# Patient Record
Sex: FEMALE | Race: WHITE | NOT HISPANIC OR LATINO | URBAN - METROPOLITAN AREA
[De-identification: names, ages, dates, MRNs, and addresses within clinical notes are randomized per-mention and may not be internally consistent; named-entity substitution may affect disease eponyms.]

---

## 2017-01-17 ENCOUNTER — OFFICE VISIT (OUTPATIENT)
Dept: INTERNAL MEDICINE | Facility: CLINIC | Age: 52
End: 2017-01-17

## 2017-01-17 VITALS
HEIGHT: 64 IN | WEIGHT: 142.4 LBS | BODY MASS INDEX: 24.31 KG/M2 | DIASTOLIC BLOOD PRESSURE: 70 MMHG | SYSTOLIC BLOOD PRESSURE: 102 MMHG

## 2017-01-17 DIAGNOSIS — R10.2 VAGINAL PAIN: Primary | ICD-10-CM

## 2017-01-17 DIAGNOSIS — Z12.11 SCREENING FOR COLON CANCER: ICD-10-CM

## 2017-01-17 DIAGNOSIS — R53.82 CHRONIC FATIGUE: ICD-10-CM

## 2017-01-17 DIAGNOSIS — J30.2 SEASONAL ALLERGIC RHINITIS, UNSPECIFIED ALLERGIC RHINITIS TRIGGER: ICD-10-CM

## 2017-01-17 DIAGNOSIS — Z11.59 SCREENING FOR VIRAL DISEASE: ICD-10-CM

## 2017-01-17 DIAGNOSIS — R41.3 MEMORY DISORDER: ICD-10-CM

## 2017-01-17 DIAGNOSIS — K59.01 SLOW TRANSIT CONSTIPATION: ICD-10-CM

## 2017-01-17 LAB — TSH SERPL DL<=0.05 MIU/L-ACNC: 1.66 MIU/ML (ref 0.4–4.2)

## 2017-01-17 PROCEDURE — 36415 COLL VENOUS BLD VENIPUNCTURE: CPT | Performed by: INTERNAL MEDICINE

## 2017-01-17 PROCEDURE — 84443 ASSAY THYROID STIM HORMONE: CPT | Performed by: INTERNAL MEDICINE

## 2017-01-17 PROCEDURE — 99213 OFFICE O/P EST LOW 20 MIN: CPT | Performed by: INTERNAL MEDICINE

## 2017-01-17 RX ORDER — ASCORBIC ACID 500 MG
500 TABLET ORAL AS NEEDED
COMMUNITY

## 2017-01-17 RX ORDER — LOTEPREDNOL ETABONATE 2 MG/ML
1 SUSPENSION/ DROPS OPHTHALMIC 3 TIMES DAILY
Refills: 0 | COMMUNITY
Start: 2017-01-05 | End: 2018-10-24

## 2017-01-17 RX ORDER — MULTIPLE VITAMINS W/ MINERALS TAB 9MG-400MCG
1 TAB ORAL CONTINUOUS PRN
COMMUNITY
End: 2020-08-07

## 2017-01-17 RX ORDER — LANOLIN ALCOHOL/MO/W.PET/CERES
1000 CREAM (GRAM) TOPICAL CONTINUOUS PRN
COMMUNITY

## 2017-01-17 NOTE — MR AVS SNAPSHOT
Franci Mandujano   1/17/2017 9:15 AM   Office Visit    Dept Phone:  [phone]   Encounter #:  [id number]    Provider:  Priya Connors MD   Department:  [facility] INTERNAL MEDICINE                Your Full Care Plan              Your Updated Medication List          This list is accurate as of: 1/17/17 10:19 AM.  Always use your most recent med list.                ALREX 0.2 % suspension   Generic drug:  loteprednol       estradiol 10 MCG tablet vaginal tablet   Commonly known as:  VAGIFEM   Insert 1 tablet into the vagina 2 (Two) Times a Week.       guaiFENesin 600 MG 12 hr tablet   Commonly known as:  MUCINEX       IBUPROFEN AND PSE COLD & SINUS  MG tablet   Generic drug:  Pseudoephedrine-Ibuprofen       multivitamin with minerals tablet tablet       NASONEX 50 MCG/ACT nasal spray   Generic drug:  mometasone       vitamin B-12 1000 MCG tablet   Commonly known as:  CYANOCOBALAMIN       vitamin C 500 MG tablet   Commonly known as:  ASCORBIC ACID               We Performed the Following     Ambulatory Referral to General Surgery       You Were Diagnosed With        Codes Comments    Vaginal pain    -  Primary ICD-10-CM: R10.2  ICD-9-CM: 625.9     Memory disorder     ICD-10-CM: R41.3  ICD-9-CM: 780.93     Seasonal allergic rhinitis, unspecified allergic rhinitis trigger     ICD-10-CM: J30.2  ICD-9-CM: 477.9     Screening for colon cancer     ICD-10-CM: Z12.11  ICD-9-CM: V76.51     Chronic fatigue     ICD-10-CM: R53.82  ICD-9-CM: 780.79     Slow transit constipation     ICD-10-CM: K59.01  ICD-9-CM: 564.01     Screening for viral disease     ICD-10-CM: Z11.59  ICD-9-CM: V73.99       Instructions     None    Patient Instructions History      Upcoming Appointments     Visit Type Date Time Department    FOLLOW UP 1/17/2017  9:15 AM PARTH HOWARD      [employer] Signup     [facility] [employer] allows you to send messages to your doctor, view your test results, renew your  "prescriptions, schedule appointments, and more. To sign up, go to [email or web address] and click on the Sign Up Now link in the New User? box. Enter your [employer] Activation Code exactly as it appears below along with the last four digits of your Social Security Number and your Date of Birth () to complete the sign-up process. If you do not sign up before the expiration date, you must request a new code.    [employer] Activation Code: [email or web address]  Expires: 2017 10:19 AM    If you have questions, you can email [email or web address] or call [phone] to talk to our [employer] staff. Remember, [employer] is NOT to be used for urgent needs. For medical emergencies, dial 911.               Other Info from Your Visit           Allergies     Vicodin [Hydrocodone-acetaminophen]        Reason for Visit     Vaginal Pain 3 month follow-up    Memory Loss 3 month follow-up      Vital Signs     Blood Pressure Height Weight Breastfeeding? Body Mass Index Smoking Status    102/70 (BP Location: Left arm, Patient Position: Sitting, Cuff Size: Adult) 64\" (162.6 cm) 142 lb 6.4 oz (64.6 kg) No 24.44 kg/m2 Never Smoker      Problems and Diagnoses Noted     Nasal inflammation due to allergen    Chronic fatigue    Memory disorder    Constipation due to slow movement through bowels    Vaginal pain    Screen for colon cancer        Screening for viral disease            "

## 2017-01-17 NOTE — PROGRESS NOTES
"Brittni Mandujano is a 51 y.o. female here for   Chief Complaint   Patient presents with   • Vaginal Pain     3 month follow-up   • Memory Loss     3 month follow-up   .    Vitals:    01/17/17 0947   BP: 102/70   BP Location: Left arm   Patient Position: Sitting   Cuff Size: Adult   Weight: 142 lb 6.4 oz (64.6 kg)   Height: 64\" (162.6 cm)       Vaginal Pain   The patient's pertinent negatives include no genital itching, genital lesions, genital odor, genital rash, pelvic pain, vaginal bleeding or vaginal discharge. This is a recurrent problem. The problem occurs rarely. The problem has been gradually improving. The patient is experiencing no pain. Associated symptoms include constipation. Pertinent negatives include no abdominal pain, anorexia, chills, diarrhea, dysuria, fever, hematuria or rash.   Memory Loss   Associated symptoms include fatigue. Pertinent negatives include no abdominal pain, anorexia, chest pain, chills, coughing, fever or rash.        Encounter Diagnoses   Name Primary?   • Vaginal pain Yes   • Memory disorder    • Seasonal allergic rhinitis, unspecified allergic rhinitis trigger    • Screening for colon cancer    • Chronic fatigue    • Slow transit constipation    • Screening for viral disease        The following portions of the patient's history were reviewed and updated as appropriate: allergies, current medications, past social history and problem list.    Review of Systems   Constitutional: Positive for fatigue. Negative for chills and fever.   Respiratory: Negative for cough, shortness of breath and wheezing.    Cardiovascular: Negative for chest pain, palpitations and leg swelling.   Gastrointestinal: Positive for constipation. Negative for abdominal pain, anorexia, blood in stool and diarrhea.   Genitourinary: Positive for vaginal pain. Negative for dysuria, hematuria, pelvic pain and vaginal discharge.   Skin: Negative for rash.   Psychiatric/Behavioral: Negative for dysphoric " mood and sleep disturbance. The patient is not nervous/anxious.        Objective   Physical Exam   Constitutional: She appears well-developed and well-nourished. No distress.   Cardiovascular: Normal rate, regular rhythm and normal heart sounds.    Pulmonary/Chest: No respiratory distress. She has no wheezes. She has no rales. She exhibits no tenderness.   Abdominal: Soft. She exhibits no mass. There is no tenderness. There is no rebound and no guarding.   Musculoskeletal: She exhibits no edema.   Psychiatric: She has a normal mood and affect. Her behavior is normal.   Nursing note and vitals reviewed.      Assessment/Plan   Problem List Items Addressed This Visit        Unprioritized    Allergic rhinitis    Memory disorder    Vaginal pain - Primary    Chronic fatigue    Relevant Orders    TSH    Slow transit constipation    Relevant Orders    TSH      Other Visit Diagnoses     Screening for colon cancer        Relevant Orders    Ambulatory Referral to General Surgery    Screening for viral disease        Relevant Orders    Hepatitis C Antibody         Vaginal pain is gone & memory improved.  Still with fatigue & constipation - need recheck TSH (borderline 3 mos ago).   Need c-scope (referred).     Td 3/2014 - need Tdap 3/2019.

## 2017-01-18 LAB — HCV AB S/CO SERPL IA: <0.1 S/CO RATIO (ref 0–0.9)

## 2017-01-19 DIAGNOSIS — Z12.11 ENCOUNTER FOR SCREENING COLONOSCOPY: Primary | ICD-10-CM

## 2017-01-19 RX ORDER — PEG-3350, SODIUM SULFATE, SODIUM CHLORIDE, POTASSIUM CHLORIDE, SODIUM ASCORBATE AND ASCORBIC ACID 7.5-2.691G
KIT ORAL
Qty: 1 EACH | Refills: 0 | Status: SHIPPED | OUTPATIENT
Start: 2017-01-19 | End: 2018-10-24

## 2017-02-03 ENCOUNTER — ANESTHESIA EVENT (OUTPATIENT)
Dept: GASTROENTEROLOGY | Facility: HOSPITAL | Age: 52
End: 2017-02-03

## 2017-02-03 ENCOUNTER — ANESTHESIA (OUTPATIENT)
Dept: GASTROENTEROLOGY | Facility: HOSPITAL | Age: 52
End: 2017-02-03

## 2017-02-03 ENCOUNTER — HOSPITAL ENCOUNTER (OUTPATIENT)
Facility: HOSPITAL | Age: 52
Setting detail: HOSPITAL OUTPATIENT SURGERY
Discharge: HOME | End: 2017-02-03
Attending: SURGERY | Admitting: SURGERY

## 2017-02-03 VITALS
WEIGHT: 137.38 LBS | DIASTOLIC BLOOD PRESSURE: 64 MMHG | RESPIRATION RATE: 14 BRPM | OXYGEN SATURATION: 100 % | HEART RATE: 58 BPM | SYSTOLIC BLOOD PRESSURE: 99 MMHG | BODY MASS INDEX: 23.45 KG/M2 | TEMPERATURE: 97.8 F | HEIGHT: 64 IN

## 2017-02-03 PROBLEM — Z12.11 ENCOUNTER FOR SCREENING COLONOSCOPY: Status: ACTIVE | Noted: 2017-02-03

## 2017-02-03 PROCEDURE — S0260 H&P FOR SURGERY: HCPCS | Performed by: SURGERY

## 2017-02-03 PROCEDURE — 45378 DIAGNOSTIC COLONOSCOPY: CPT | Performed by: SURGERY

## 2017-02-03 PROCEDURE — 25010000002 PROPOFOL 10 MG/ML EMULSION: Performed by: NURSE ANESTHETIST, CERTIFIED REGISTERED

## 2017-02-03 RX ORDER — PROMETHAZINE HYDROCHLORIDE 25 MG/ML
12.5 INJECTION, SOLUTION INTRAMUSCULAR; INTRAVENOUS ONCE AS NEEDED
Status: DISCONTINUED | OUTPATIENT
Start: 2017-02-03 | End: 2017-02-03 | Stop reason: HOSPADM

## 2017-02-03 RX ORDER — SODIUM CHLORIDE, SODIUM LACTATE, POTASSIUM CHLORIDE, CALCIUM CHLORIDE 600; 310; 30; 20 MG/100ML; MG/100ML; MG/100ML; MG/100ML
30 INJECTION, SOLUTION INTRAVENOUS CONTINUOUS PRN
Status: DISCONTINUED | OUTPATIENT
Start: 2017-02-03 | End: 2017-02-03 | Stop reason: HOSPADM

## 2017-02-03 RX ORDER — LIDOCAINE HYDROCHLORIDE 20 MG/ML
INJECTION, SOLUTION INFILTRATION; PERINEURAL AS NEEDED
Status: DISCONTINUED | OUTPATIENT
Start: 2017-02-03 | End: 2017-02-03 | Stop reason: SURG

## 2017-02-03 RX ORDER — ONDANSETRON 2 MG/ML
4 INJECTION INTRAMUSCULAR; INTRAVENOUS ONCE AS NEEDED
Status: DISCONTINUED | OUTPATIENT
Start: 2017-02-03 | End: 2017-02-03 | Stop reason: HOSPADM

## 2017-02-03 RX ORDER — PROMETHAZINE HYDROCHLORIDE 25 MG/1
25 TABLET ORAL ONCE AS NEEDED
Status: DISCONTINUED | OUTPATIENT
Start: 2017-02-03 | End: 2017-02-03 | Stop reason: HOSPADM

## 2017-02-03 RX ORDER — PROPOFOL 10 MG/ML
VIAL (ML) INTRAVENOUS CONTINUOUS PRN
Status: DISCONTINUED | OUTPATIENT
Start: 2017-02-03 | End: 2017-02-03 | Stop reason: SURG

## 2017-02-03 RX ORDER — PROMETHAZINE HYDROCHLORIDE 25 MG/1
25 SUPPOSITORY RECTAL ONCE AS NEEDED
Status: DISCONTINUED | OUTPATIENT
Start: 2017-02-03 | End: 2017-02-03 | Stop reason: HOSPADM

## 2017-02-03 RX ADMIN — LIDOCAINE HYDROCHLORIDE 40 MG: 20 INJECTION, SOLUTION INFILTRATION; PERINEURAL at 10:01

## 2017-02-03 RX ADMIN — PROPOFOL 360 MCG/KG/MIN: 10 INJECTION, EMULSION INTRAVENOUS at 10:01

## 2017-02-03 RX ADMIN — SODIUM CHLORIDE, POTASSIUM CHLORIDE, SODIUM LACTATE AND CALCIUM CHLORIDE: 600; 310; 30; 20 INJECTION, SOLUTION INTRAVENOUS at 10:01

## 2017-02-03 RX ADMIN — SODIUM CHLORIDE, POTASSIUM CHLORIDE, SODIUM LACTATE AND CALCIUM CHLORIDE 30 ML/HR: 600; 310; 30; 20 INJECTION, SOLUTION INTRAVENOUS at 09:42

## 2017-02-03 NOTE — OP NOTE
Surgeon: Guzman Lance Jr., M.D.    Preoperative Diagnosis: Need for screening colonoscopy    Postoperative Diagnosis: Normal colonoscopy    Procedure Performed: Colonoscopy    Indications:     The patient is a very pleasant 51-year-old white female who is in need of a screening colonoscopy.  The patient understands the indications, alternatives, risks, and benefits of the procedure and wishes to proceed.    Procedure:     The patient was identified, taken to the endoscopy suite, and place in the left side down decubitus procedure.  The patient underwent a MAC anesthesia and was appropriately monitored through the case by the anesthesia personnel.  A rectal exam was performed that was normal.  The colonoscope was introduced into the rectum and advanced very carefully to the cecum that was identified by the cecal strap, ileocecal valve, and the appendiceal orifice.  The scope was then slowly withdrawn with careful circumferential examination of the mucosa performed.  The bowel prep was good allowing adequate visualization of mucosal surfaces.  The scope was withdrawn.  The patient tolerated the procedure well and was transferred the recovery area in stable condition.    Findings:    There were no abnormalities identified in the colon.    Recommendations:     1.  Repeat colonoscopy in 10 years unless symptoms develop.

## 2017-02-03 NOTE — ANESTHESIA PREPROCEDURE EVALUATION
Anesthesia Evaluation     Patient summary reviewed and Nursing notes reviewed    Airway   Mallampati: I  TM distance: >3 FB  Neck ROM: full  no difficulty expected  Dental - normal exam     Pulmonary - negative pulmonary ROS and normal exam   Cardiovascular - negative cardio ROS and normal exam    Neuro/Psych- negative ROS  GI/Hepatic/Renal/Endo - negative ROS     Musculoskeletal (-) negative ROS    Abdominal  - normal exam    Bowel sounds: normal.   Substance History - negative use     OB/GYN negative ob/gyn ROS         Other                           Anesthesia Plan    ASA 1     MAC     intravenous induction   Anesthetic plan and risks discussed with patient.

## 2017-02-03 NOTE — ANESTHESIA POSTPROCEDURE EVALUATION
Patient: Franci Mandujano    Procedure Summary     Date Anesthesia Start Anesthesia Stop Room / Location    02/03/17 1001 1032  [facility] /  [facility]       Procedure Diagnosis Surgeon Provider    COLONOSCOPY TO CECUM (N/A ) Encounter for screening colonoscopy  (Encounter for screening colonoscopy [Z12.11]) MD Anamaria Leon Jr., MD          Anesthesia Type: MAC  Last vitals  BP (!) 87/53 (02/03/17 1043)    Temp      Pulse 57 (02/03/17 1043)   Resp 14 (02/03/17 1043)    SpO2 100 % (02/03/17 1043)      Post Anesthesia Care and Evaluation    Patient location during evaluation: PACU  Patient participation: complete - patient participated  Level of consciousness: awake  Pain score: 0  Pain management: adequate  Airway patency: patent  Anesthetic complications: No anesthetic complications    Cardiovascular status: acceptable  Respiratory status: acceptable  Hydration status: acceptable

## 2017-02-03 NOTE — PLAN OF CARE
Problem: Patient Care Overview (Adult)  Goal: Plan of Care Review  Outcome: Ongoing (interventions implemented as appropriate)    02/03/17 0924   Coping/Psychosocial Response Interventions   Plan Of Care Reviewed With patient   Patient Care Overview   Progress progress toward functional goals as expected       Goal: Adult Individualization and Mutuality  Outcome: Ongoing (interventions implemented as appropriate)  Goal: Discharge Needs Assessment  Outcome: Ongoing (interventions implemented as appropriate)    02/03/17 0924   Discharge Needs Assessment   Concerns To Be Addressed no discharge needs identified   Discharge Disposition home or self-care   Living Environment   Transportation Available car         Problem: GI Endoscopy (Adult)  Goal: Signs and Symptoms of Listed Potential Problems Will be Absent or Manageable (GI Endoscopy)  Outcome: Ongoing (interventions implemented as appropriate)    02/03/17 0924   GI Endoscopy   Problems Present (GI Endoscopy) none

## 2017-02-03 NOTE — H&P
Patient Care Team:  Priya Connors MD as PCP - General (Internal Medicine)  Pam Hoskins MD (Dermatology)  Sherley Sanchez MD as Consulting Physician (Obstetrics and Gynecology)  Jonathan Daniel DPM as Consulting Physician (Podiatry)    Chief complaint:  Need for screening colonoscopy    Subjective     History of Present Illness     The patient is a very pleasant 51-year-old white female who presents for screening colonoscopy.  She has no significant GI symptoms.  She has never had a previous colonoscopy.    Review of Systems   Constitutional: Negative for activity change, appetite change, fatigue and fever.   Respiratory: Negative for chest tightness and shortness of breath.    Cardiovascular: Negative for chest pain and palpitations.   Gastrointestinal: Negative for abdominal pain, blood in stool, constipation, diarrhea, nausea and vomiting.   Genitourinary: Negative for dysuria and flank pain.   Neurological: Negative for dizziness and light-headedness.   Hematological: Negative for adenopathy. Does not bruise/bleed easily.   Psychiatric/Behavioral: Negative for agitation and confusion.        Past Medical History   Diagnosis Date   • Allergic rhinitis    • Arthritis    • Cellulitis of third finger, right    • Numbness    • Sciatica    • Vaginal prolapse      Past Surgical History   Procedure Laterality Date   • Dilatation and curettage     • Vaginal mesh revision     • Toe surgery Bilateral    • Foot surgery       Family History   Problem Relation Age of Onset   • Lung cancer Mother    • Heart disease Father      Social History   Substance Use Topics   • Smoking status: Never Smoker   • Smokeless tobacco: Never Used   • Alcohol use Yes      Comment: Occasional     Allergies:  Vicodin [hydrocodone-acetaminophen]    Objective      Vital Signs  Temp:  [97.8 °F (36.6 °C)] 97.8 °F (36.6 °C)  Heart Rate:  [64] 64  Resp:  [16] 16  BP: (94)/(71) 94/71    Physical Exam   Constitutional: She is  oriented to person, place, and time. She appears well-developed and well-nourished.  Non-toxic appearance.   Eyes: EOM are normal. No scleral icterus.   Pulmonary/Chest: Effort normal. No respiratory distress.   Abdominal: Soft. Normal appearance. There is no tenderness.   Neurological: She is alert and oriented to person, place, and time.   Skin: Skin is warm and dry.   Psychiatric: She has a normal mood and affect. Her behavior is normal. Judgment and thought content normal.       Results Review:   I reviewed the patient's new clinical results.      Assessment/Plan     Active Problems:    Encounter for screening colonoscopy      Assessment & Plan     1.  Need for screening colonoscopy: Plan colonoscopy.  The patient understands the indications, alternatives, risks, and benefits of the procedure and wishes to proceed.    I discussed the patients findings and my recommendations with patient    Guzman Lance Jr., MD  02/03/17  10:04 AM

## 2017-02-21 ENCOUNTER — APPOINTMENT (OUTPATIENT)
Dept: WOMENS IMAGING | Facility: HOSPITAL | Age: 52
End: 2017-02-21

## 2017-02-21 PROCEDURE — 77067 SCR MAMMO BI INCL CAD: CPT | Performed by: RADIOLOGY

## 2017-12-22 ENCOUNTER — OFFICE VISIT (OUTPATIENT)
Dept: INTERNAL MEDICINE | Facility: CLINIC | Age: 52
End: 2017-12-22

## 2017-12-22 VITALS
HEIGHT: 64 IN | SYSTOLIC BLOOD PRESSURE: 106 MMHG | BODY MASS INDEX: 23.39 KG/M2 | DIASTOLIC BLOOD PRESSURE: 64 MMHG | TEMPERATURE: 98.7 F | WEIGHT: 137 LBS

## 2017-12-22 DIAGNOSIS — L73.9 FOLLICULITIS: Primary | ICD-10-CM

## 2017-12-22 PROCEDURE — 99213 OFFICE O/P EST LOW 20 MIN: CPT | Performed by: INTERNAL MEDICINE

## 2017-12-22 RX ORDER — AMOXICILLIN 500 MG/1
500 TABLET, FILM COATED ORAL 2 TIMES DAILY
Qty: 14 TABLET | Refills: 0 | Status: SHIPPED | OUTPATIENT
Start: 2017-12-22 | End: 2018-10-24

## 2017-12-22 NOTE — PROGRESS NOTES
"Chief Complaint   Patient presents with   • Rash     spot in the back of head, having fluid coming out   • Nasal Congestion     nasal congestion and headaches        Subjective   Franci Mandujano is a 52 y.o. female     HPI:   She comes in for evaluation for spot on the back of her head.  She developed this in September.  It initially just felt itchy.  She has tried using head and shoulders shampoo.  Did not help.  She's tried applying Neosporin.  She has been scratching it and the area is now \"oozy\".  No fevers, chills, sweats.  It doesn't really hurt.  It just itches.    She developed congestion on Sunday.  She has a tickly feeling in her throat.  On Monday she noticed some congestion.  She is taking an over-the-counter decongestant.  She did a video M.D. visit.  Afrin and Flonase were recommended.  She is improving slowly.        The following portions of the patient's history were reviewed and updated as appropriate: allergies, current medications, past social history, problem list, past surgical history    Review of Systems   Constitutional: Negative for appetite change.   Respiratory: Negative for shortness of breath.    Cardiovascular: Negative for chest pain and leg swelling.   Hematological: Negative for adenopathy.           Objective     /64  Temp 98.7 °F (37.1 °C)  Ht 162.6 cm (64\")  Wt 62.1 kg (137 lb)  BMI 23.52 kg/m2     Physical Exam   Constitutional: She appears well-nourished. No distress.   Cardiovascular: Normal rate and regular rhythm.    Pulmonary/Chest: Effort normal and breath sounds normal.   Skin:   Scalp: Lesion present posterior scalp just in the hairline.  No erythema.  No tenderness to palpation.  It is crusty.    Hair is matted over the area.   Nursing note and vitals reviewed.      Assessment/Plan   Problem List Items Addressed This Visit     None      Visit Diagnoses     Folliculitis    -  Primary      amoxicillin 500 mgm bid x 7 days.  She will continue using a mild shampoo.  " Encouraged her not to scratch.  If treatment with amoxicillin does not help, she will need to see a dermatologist.  She may continue Flonase and Afrin for respiratory symptoms.  Advised her not to use Afrin more than 3 days.

## 2018-10-24 ENCOUNTER — APPOINTMENT (OUTPATIENT)
Dept: WOMENS IMAGING | Facility: HOSPITAL | Age: 53
End: 2018-10-24

## 2018-10-24 ENCOUNTER — OFFICE VISIT (OUTPATIENT)
Dept: INTERNAL MEDICINE | Facility: CLINIC | Age: 53
End: 2018-10-24

## 2018-10-24 VITALS
DIASTOLIC BLOOD PRESSURE: 60 MMHG | WEIGHT: 148 LBS | HEART RATE: 73 BPM | HEIGHT: 64 IN | TEMPERATURE: 98.1 F | OXYGEN SATURATION: 98 % | SYSTOLIC BLOOD PRESSURE: 100 MMHG | BODY MASS INDEX: 25.27 KG/M2

## 2018-10-24 DIAGNOSIS — M79.671 RIGHT FOOT PAIN: Primary | ICD-10-CM

## 2018-10-24 DIAGNOSIS — Z23 NEED FOR INFLUENZA VACCINATION: ICD-10-CM

## 2018-10-24 PROCEDURE — 90471 IMMUNIZATION ADMIN: CPT | Performed by: NURSE PRACTITIONER

## 2018-10-24 PROCEDURE — 73630 X-RAY EXAM OF FOOT: CPT | Performed by: RADIOLOGY

## 2018-10-24 PROCEDURE — 90674 CCIIV4 VAC NO PRSV 0.5 ML IM: CPT | Performed by: NURSE PRACTITIONER

## 2018-10-24 PROCEDURE — 99213 OFFICE O/P EST LOW 20 MIN: CPT | Performed by: NURSE PRACTITIONER

## 2018-10-24 PROCEDURE — 73630 X-RAY EXAM OF FOOT: CPT | Performed by: NURSE PRACTITIONER

## 2018-10-24 NOTE — PROGRESS NOTES
Subjective   Franci Mandujano is a 53 y.o. female who presents due to right foot pain.    She accidentally kicked a piece of molding damir 2.5 weeks ago, c/o pain and swelling in the right 4th toe since then. Pain is worse with weightbearing. 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      Foot Pain   This is a new problem. The current episode started 1 to 4 weeks ago (sx began damir 2.5 weeks). The problem occurs daily. The problem has been unchanged. Associated symptoms include arthralgias, joint swelling and numbness. Pertinent negatives include no abdominal pain, chest pain, chills, congestion, coughing, fatigue, fever, headaches, nausea, rash, sore throat, vomiting or weakness. The symptoms are aggravated by walking. She has tried rest for the symptoms. The treatment provided no relief.        The following portions of the patient's history were reviewed and updated as appropriate: allergies, current medications, past social history and problem list.    Past Medical History:   Diagnosis Date   • Allergic rhinitis    • Arthritis    • Cellulitis of third finger, right    • Numbness    • Sciatica    • Vaginal prolapse          Current Outpatient Prescriptions:   •  guaiFENesin (MUCINEX) 600 MG 12 hr tablet, Take 1,200 mg by mouth As Needed for cough or congestion., Disp: , Rfl:   •  mometasone (NASONEX) 50 MCG/ACT nasal spray, 2 sprays into each nostril As Needed (Nasal Congestion)., Disp: , Rfl:   •  Multiple Vitamins-Minerals (MULTIVITAMIN WITH MINERALS) tablet tablet, Take 1 tablet by mouth Continuous As Needed., Disp: , Rfl:   •  Pseudoephedrine-Ibuprofen (IBUPROFEN AND PSE COLD & SINUS)  MG tablet, Take 2 capsules by mouth As Needed (cold/sinus)., Disp: , Rfl:   •  vitamin B-12 (CYANOCOBALAMIN) 1000 MCG tablet, Take 1,000 mcg by mouth Continuous As Needed., Disp: , Rfl:   •  vitamin C (ASCORBIC ACID) 500 MG tablet, Take 500 mg by mouth As Needed., Disp: , Rfl:   •   "diclofenac (VOLTAREN) 1 % gel gel, Apply 4 g topically to the appropriate area as directed 4 (Four) Times a Day As Needed (pain)., Disp: 100 g, Rfl: 0  No current facility-administered medications for this visit.     Allergies   Allergen Reactions   • Hydrocodone-Acetaminophen Nausea And Vomiting   • Vicodin [Hydrocodone-Acetaminophen]        Review of Systems   Constitutional: Negative for chills, fatigue, fever and unexpected weight change.   HENT: Negative for congestion, ear pain, postnasal drip, sinus pressure, sore throat and trouble swallowing.    Eyes: Negative for visual disturbance.   Respiratory: Negative for cough, chest tightness and wheezing.    Cardiovascular: Negative for chest pain, palpitations and leg swelling.   Gastrointestinal: Negative for abdominal pain, blood in stool, nausea and vomiting.   Genitourinary: Negative for dysuria, frequency and urgency.   Musculoskeletal: Positive for arthralgias and joint swelling.   Skin: Negative for color change and rash.   Neurological: Positive for numbness. Negative for syncope, weakness and headaches.   Hematological: Does not bruise/bleed easily.       Objective   Vitals:    10/24/18 0840   BP: 100/60   BP Location: Left arm   Patient Position: Sitting   Cuff Size: Adult   Pulse: 73   Temp: 98.1 °F (36.7 °C)   TempSrc: Oral   SpO2: 98%   Weight: 67.1 kg (148 lb)   Height: 162.6 cm (64\")     Physical Exam   Constitutional: She appears well-developed and well-nourished. She is cooperative. She does not have a sickly appearance. She does not appear ill.   HENT:   Head: Normocephalic.   Right Ear: Hearing, tympanic membrane and external ear normal.   Left Ear: Hearing, tympanic membrane and external ear normal.   Nose: Nose normal. No mucosal edema, rhinorrhea, sinus tenderness or nasal deformity. Right sinus exhibits no maxillary sinus tenderness and no frontal sinus tenderness. Left sinus exhibits no maxillary sinus tenderness and no frontal sinus " tenderness.   Mouth/Throat: Oropharynx is clear and moist and mucous membranes are normal. Normal dentition.   Eyes: Conjunctivae and lids are normal. Right eye exhibits no discharge and no exudate. Left eye exhibits no discharge and no exudate.   Neck: Trachea normal. Carotid bruit is not present. No edema present. No thyroid mass present.   Cardiovascular: Regular rhythm, normal heart sounds and normal pulses.    No murmur heard.  Pulmonary/Chest: Breath sounds normal. No respiratory distress. She has no decreased breath sounds. She has no wheezes. She has no rhonchi. She has no rales.   Musculoskeletal:        Right foot: There is tenderness. There is normal range of motion.   +tenderness with soft tissue swelling in the right 4th metatarsal   Lymphadenopathy:        Head (right side): No submental, no submandibular, no tonsillar, no preauricular, no posterior auricular and no occipital adenopathy present.        Head (left side): No submental, no submandibular, no tonsillar, no preauricular, no posterior auricular and no occipital adenopathy present.   Neurological: She is alert.   Skin: Skin is warm, dry and intact. No cyanosis. Nails show no clubbing.       Assessment/Plan   Franci was seen today for foot pain.    Diagnoses and all orders for this visit:    Right foot pain  -     XR Foot 3+ View Right  -     diclofenac (VOLTAREN) 1 % gel gel; Apply 4 g topically to the appropriate area as directed 4 (Four) Times a Day As Needed (pain).    Need for influenza vaccination  -     Influenza Vac Subunit Quad (FLUCELVAX) injection 0.5 mL; Inject 0.5 mL into the appropriate muscle as directed by prescriber 1 (One) Time.    Xrays suggests previous trauma with remodeling but no acute abnl, will send to radiology for review. The toe is wrapped for further protection, advised to continue to monitor and consider further evaluation if sx persist.

## 2018-10-30 ENCOUNTER — APPOINTMENT (OUTPATIENT)
Dept: WOMENS IMAGING | Facility: HOSPITAL | Age: 53
End: 2018-10-30

## 2018-10-30 ENCOUNTER — OFFICE VISIT (OUTPATIENT)
Dept: INTERNAL MEDICINE | Facility: CLINIC | Age: 53
End: 2018-10-30

## 2018-10-30 VITALS
BODY MASS INDEX: 25.27 KG/M2 | WEIGHT: 148 LBS | DIASTOLIC BLOOD PRESSURE: 70 MMHG | HEART RATE: 68 BPM | HEIGHT: 64 IN | SYSTOLIC BLOOD PRESSURE: 100 MMHG | OXYGEN SATURATION: 98 %

## 2018-10-30 DIAGNOSIS — S92.911A CLOSED NONDISPLACED FRACTURE OF PHALANX OF TOE OF RIGHT FOOT, UNSPECIFIED TOE, INITIAL ENCOUNTER: Primary | ICD-10-CM

## 2018-10-30 DIAGNOSIS — M79.671 RIGHT FOOT PAIN: Primary | ICD-10-CM

## 2018-10-30 PROCEDURE — 73630 X-RAY EXAM OF FOOT: CPT | Performed by: NURSE PRACTITIONER

## 2018-10-30 PROCEDURE — 73630 X-RAY EXAM OF FOOT: CPT | Performed by: RADIOLOGY

## 2018-10-30 PROCEDURE — 99213 OFFICE O/P EST LOW 20 MIN: CPT | Performed by: NURSE PRACTITIONER

## 2018-10-30 NOTE — PROGRESS NOTES
Subjective   Franci Mandujano is a 53 y.o. female who presents for f/u regarding right foot pain.    She accidentally kicked a piece of molding damir 3 weeks ago, c/o pain and swelling in the right 4th toe since then. Pain is worse with weightbearing. 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 Her xray last week showed a possible hairline fracture, she continues to c/o burning in the area. She has switched to wearing a tennis shoe daily at work which has helped.      Foot Pain   This is a new problem. The current episode started 1 to 4 weeks ago (sx began damir 3 weeks ago). The problem occurs daily. The problem has been waxing and waning. Associated symptoms include arthralgias, joint swelling and numbness. Pertinent negatives include no abdominal pain, chest pain, chills, congestion, coughing, fatigue, fever, headaches, rash, sore throat or weakness. The symptoms are aggravated by walking. She has tried rest for the symptoms. The treatment provided no relief.        The following portions of the patient's history were reviewed and updated as appropriate: allergies, current medications, past social history and problem list.    Past Medical History:   Diagnosis Date   • Allergic rhinitis    • Arthritis    • Cellulitis of third finger, right    • Numbness    • Sciatica    • Vaginal prolapse          Current Outpatient Prescriptions:   •  diclofenac (VOLTAREN) 1 % gel gel, Apply 4 g topically to the appropriate area as directed 4 (Four) Times a Day As Needed (pain)., Disp: 100 g, Rfl: 0  •  guaiFENesin (MUCINEX) 600 MG 12 hr tablet, Take 1,200 mg by mouth As Needed for cough or congestion., Disp: , Rfl:   •  mometasone (NASONEX) 50 MCG/ACT nasal spray, 2 sprays into each nostril As Needed (Nasal Congestion)., Disp: , Rfl:   •  Multiple Vitamins-Minerals (MULTIVITAMIN WITH MINERALS) tablet tablet, Take 1 tablet by mouth Continuous As Needed., Disp: , Rfl:   •  Pseudoephedrine-Ibuprofen  "(IBUPROFEN AND PSE COLD & SINUS)  MG tablet, Take 2 capsules by mouth As Needed (cold/sinus)., Disp: , Rfl:   •  vitamin B-12 (CYANOCOBALAMIN) 1000 MCG tablet, Take 1,000 mcg by mouth Continuous As Needed., Disp: , Rfl:   •  vitamin C (ASCORBIC ACID) 500 MG tablet, Take 500 mg by mouth As Needed., Disp: , Rfl:     Allergies   Allergen Reactions   • Hydrocodone-Acetaminophen Nausea And Vomiting   • Vicodin [Hydrocodone-Acetaminophen]        Review of Systems   Constitutional: Negative for chills, fatigue, fever and unexpected weight change.   HENT: Negative for congestion, ear pain, postnasal drip, sinus pressure, sore throat and trouble swallowing.    Eyes: Negative for visual disturbance.   Respiratory: Negative for cough, chest tightness and wheezing.    Cardiovascular: Negative for chest pain, palpitations and leg swelling.   Gastrointestinal: Negative for abdominal pain.   Musculoskeletal: Positive for arthralgias and joint swelling.   Skin: Negative for color change and rash.   Neurological: Positive for numbness. Negative for syncope, weakness and headaches.   Hematological: Does not bruise/bleed easily.       Objective   Vitals:    10/30/18 0859   BP: 100/70   BP Location: Left arm   Patient Position: Sitting   Cuff Size: Adult   Pulse: 68   SpO2: 98%   Weight: 67.1 kg (148 lb)   Height: 162.6 cm (64\")     Physical Exam   Constitutional: She appears well-developed and well-nourished. She is cooperative. She does not have a sickly appearance. She does not appear ill.   HENT:   Head: Normocephalic.   Right Ear: Hearing, tympanic membrane and external ear normal.   Left Ear: Hearing, tympanic membrane and external ear normal.   Nose: Nose normal. No mucosal edema, rhinorrhea, sinus tenderness or nasal deformity. Right sinus exhibits no maxillary sinus tenderness and no frontal sinus tenderness. Left sinus exhibits no maxillary sinus tenderness and no frontal sinus tenderness.   Mouth/Throat: Oropharynx is " clear and moist and mucous membranes are normal. Normal dentition.   Eyes: Conjunctivae and lids are normal. Right eye exhibits no discharge and no exudate. Left eye exhibits no discharge and no exudate.   Neck: Trachea normal. Carotid bruit is not present. No edema present. No thyroid mass present.   Cardiovascular: Regular rhythm, normal heart sounds and normal pulses.    No murmur heard.  Pulmonary/Chest: Breath sounds normal. No respiratory distress. She has no decreased breath sounds. She has no wheezes. She has no rhonchi. She has no rales.   Musculoskeletal:        Right foot: There is tenderness (along 4th proximal phalanx). There is normal range of motion and no swelling.   Lymphadenopathy:        Head (right side): No submental, no submandibular, no tonsillar, no preauricular, no posterior auricular and no occipital adenopathy present.        Head (left side): No submental, no submandibular, no tonsillar, no preauricular, no posterior auricular and no occipital adenopathy present.   Neurological: She is alert.   Skin: Skin is warm, dry and intact. No cyanosis. Nails show no clubbing.       Assessment/Plan   Franci was seen today for foot pain.    Diagnoses and all orders for this visit:    Closed nondisplaced fracture of phalanx of toe of right foot, unspecified toe, initial encounter    Reviewed repeat xray which is suggestive of a hairline fracture of the 4th proximal phalanx-will send to radiology for review. In the meantime, she has kept the toe taped and is wearing shoes with a wide toe box for further healing. She will rest area as much as possible (c/o increased pain last week with kneeling and flexing foot).

## 2019-11-26 ENCOUNTER — OFFICE VISIT (OUTPATIENT)
Dept: INTERNAL MEDICINE | Facility: CLINIC | Age: 54
End: 2019-11-26

## 2019-11-26 ENCOUNTER — APPOINTMENT (OUTPATIENT)
Dept: WOMENS IMAGING | Facility: HOSPITAL | Age: 54
End: 2019-11-26

## 2019-11-26 VITALS
OXYGEN SATURATION: 99 % | RESPIRATION RATE: 16 BRPM | HEIGHT: 64 IN | BODY MASS INDEX: 26.7 KG/M2 | SYSTOLIC BLOOD PRESSURE: 118 MMHG | TEMPERATURE: 97.4 F | HEART RATE: 78 BPM | DIASTOLIC BLOOD PRESSURE: 78 MMHG | WEIGHT: 156.4 LBS

## 2019-11-26 DIAGNOSIS — M54.41 CHRONIC RIGHT-SIDED LOW BACK PAIN WITH RIGHT-SIDED SCIATICA: Primary | ICD-10-CM

## 2019-11-26 DIAGNOSIS — G89.29 CHRONIC RIGHT-SIDED LOW BACK PAIN WITH RIGHT-SIDED SCIATICA: Primary | ICD-10-CM

## 2019-11-26 DIAGNOSIS — M54.30 SCIATICA, UNSPECIFIED LATERALITY: ICD-10-CM

## 2019-11-26 PROCEDURE — 99214 OFFICE O/P EST MOD 30 MIN: CPT | Performed by: INTERNAL MEDICINE

## 2019-11-26 PROCEDURE — 72110 X-RAY EXAM L-2 SPINE 4/>VWS: CPT | Performed by: INTERNAL MEDICINE

## 2019-11-26 PROCEDURE — 72110 X-RAY EXAM L-2 SPINE 4/>VWS: CPT | Performed by: RADIOLOGY

## 2019-11-26 RX ORDER — CYCLOBENZAPRINE HCL 10 MG
10 TABLET ORAL 3 TIMES DAILY PRN
Qty: 30 TABLET | Refills: 1 | Status: SHIPPED | OUTPATIENT
Start: 2019-11-26

## 2019-11-26 NOTE — PROGRESS NOTES
"Subjective   Franci Mandujano is a 54 y.o. female here for   Chief Complaint   Patient presents with   • Back Pain     Right sided x 3 weeks   .    Vitals:    11/26/19 0958   BP: 118/78   BP Location: Left arm   Patient Position: Sitting   Cuff Size: Adult   Pulse: 78   Resp: 16   Temp: 97.4 °F (36.3 °C)   TempSrc: Temporal   SpO2: 99%   Weight: 70.9 kg (156 lb 6.4 oz)   Height: 163.2 cm (64.25\")       Body mass index is 26.64 kg/m².    Back Pain   This is a chronic problem. The current episode started 1 to 4 weeks ago. The problem occurs constantly. The problem has been gradually worsening since onset. The pain is present in the sacro-iliac. The quality of the pain is described as aching. The pain radiates to the right thigh. The pain is severe. The pain is worse during the night. Pertinent negatives include no chest pain, fever, paresthesias or weakness. She has tried NSAIDs for the symptoms.        The following portions of the patient's history were reviewed and updated as appropriate: allergies, current medications, past social history and problem list.    Review of Systems   Constitutional: Positive for fatigue. Negative for chills and fever.   Respiratory: Negative for cough, shortness of breath and wheezing.    Cardiovascular: Negative for chest pain, palpitations and leg swelling.   Musculoskeletal: Positive for back pain.   Neurological: Negative for weakness and paresthesias.   Psychiatric/Behavioral: Negative for dysphoric mood and sleep disturbance. The patient is nervous/anxious (worried about elderly dad).        Objective   Physical Exam   Constitutional: She appears well-developed and well-nourished. No distress.   Cardiovascular: Normal rate, regular rhythm and normal heart sounds.   Pulmonary/Chest: No respiratory distress. She has no wheezes. She has no rales. She exhibits no tenderness.   Musculoskeletal: Normal range of motion. She exhibits no edema, tenderness or deformity.   Neurological: She " is alert. No cranial nerve deficit or sensory deficit. She exhibits abnormal muscle tone (tight paralumbar muscles). Coordination normal.   Skin: Skin is warm and dry.   Psychiatric: She has a normal mood and affect. Her behavior is normal.   Nursing note and vitals reviewed.      Assessment/Plan   Diagnoses and all orders for this visit:    Chronic right-sided low back pain with right-sided sciatica  Comments:  sciatica in the past - flare up for last 3-4 wks  Orders:  -     XR Spine Lumbar Complete 4+VW  -     Ambulatory Referral to Physical Therapy Evaluate and treat, Ortho  -     cyclobenzaprine (FLEXERIL) 10 MG tablet; Take 1 tablet by mouth 3 (Three) Times a Day As Needed for Muscle Spasms.    Sciatica, unspecified laterality  Comments:  multiple x in past - need PT for back eval & Rx  Orders:  -     Ambulatory Referral to Physical Therapy Evaluate and treat, Ortho  -     cyclobenzaprine (FLEXERIL) 10 MG tablet; Take 1 tablet by mouth 3 (Three) Times a Day As Needed for Muscle Spasms.       L-S spine shows decreased space between L5-S1 - sent for over-read.     Return for CPE & labs.

## 2019-12-03 ENCOUNTER — TREATMENT (OUTPATIENT)
Dept: PHYSICAL THERAPY | Facility: CLINIC | Age: 54
End: 2019-12-03

## 2019-12-03 DIAGNOSIS — M54.30 SCIATICA, UNSPECIFIED LATERALITY: ICD-10-CM

## 2019-12-03 DIAGNOSIS — M54.41 CHRONIC RIGHT-SIDED LOW BACK PAIN WITH RIGHT-SIDED SCIATICA: Primary | ICD-10-CM

## 2019-12-03 DIAGNOSIS — G89.29 CHRONIC RIGHT-SIDED LOW BACK PAIN WITH RIGHT-SIDED SCIATICA: Primary | ICD-10-CM

## 2019-12-03 PROCEDURE — 97140 MANUAL THERAPY 1/> REGIONS: CPT | Performed by: PHYSICAL THERAPIST

## 2019-12-03 PROCEDURE — 97014 ELECTRIC STIMULATION THERAPY: CPT | Performed by: PHYSICAL THERAPIST

## 2019-12-03 PROCEDURE — 97161 PT EVAL LOW COMPLEX 20 MIN: CPT | Performed by: PHYSICAL THERAPIST

## 2019-12-03 PROCEDURE — 97110 THERAPEUTIC EXERCISES: CPT | Performed by: PHYSICAL THERAPIST

## 2019-12-03 NOTE — PROGRESS NOTES
Physical Therapy Initial Evaluation and Plan of Care        Subjective Evaluation    History of Present Illness  Mechanism of injury: Pt is 55 yo female with flare up of right sided LBP off and on for a while.  Vacuuming , pushing, yard work seems to aggravate her.  Stiffest in the morning, and loosens up in the day then wakes her at night.  She does a sitting job and needs to move around to ease the pain.  Her night pain became worse, with awakening every time she rolled over and that is when she went to see the doctor. She is involved in rock climbing and exercising at her gym        X-rays of L-S spine shows decreased space between L5-S1     Subjective comment: Worseing pain in the right LB with some burning and tingling in the back of the right leg (about 1/2 way down).   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  Quality of life: good    Pain  Current pain rating: 3  At best pain rating: 3  At worst pain ratin  Location: Right LB, SI and posterior right thigh  Quality: dull ache, burning, radiating, discomfort and pulling  Aggravating factors: standing, lifting, prolonged positioning, outstretched reach and squatting (Sitting, bending over to  items, driving > 1 hour)  Progression: no change    Social Support  Lives in: multiple-level home  Lives with: spouse    Hand dominance: right    Diagnostic Tests  X-ray: abnormal    Treatments  Previous treatment: medication  Patient Goals  Patient goals for therapy: decreased pain, increased motion, increased strength, independence with ADLs/IADLs and return to sport/leisure activities             Objective       Static Posture     Head  Forward.    Cervical Spine  Tilted right.    Shoulders  Rounded.    Thoracic Spine  Flattened thoracic spine.    Lumbar Spine   Decreased lordosis.   Lumbar spine (Right): Shifted.      Postural Observations  Seated posture: fair  Standing posture: fair  Correction of  posture: has no consistent effect        Palpation   Left   Muscle spasm in the erector spinae and quadratus lumborum.     Right   Muscle spasm in the erector spinae and quadratus lumborum.     Tenderness     Right Hip   Tenderness in the ASIS.     Active Range of Motion     Additional Active Range of Motion Details  Lumbar AROM %  Flexion 100%  Extension 50%  Right Lateral flexion 50%  Left Lateral flexion 50%      Strength/Myotome Testing     Left Hip   Planes of Motion   Flexion: 5  Extension: 4-  Abduction: 4  Adduction: 5    Right Hip   Planes of Motion   Flexion: 4+  Extension: 4-  Abduction: 4  Adduction: 5    Left Knee   Flexion: 5  Extension: 5    Right Knee   Flexion: 5  Extension: 5    Additional Strength Details  Abdominals 4/5    Tests     Lumbar     Left   Negative femoral stretch and passive SLR.     Right   Negative femoral stretch and passive SLR.     Additional Tests Details  Standing flexion (+) right  Sitting flexion (+) right         Assessment & Plan     Assessment  Impairments: abnormal muscle tone, abnormal or restricted ROM, activity intolerance, lacks appropriate home exercise program and pain with function  Assessment details: Franci Mandujano is a 54 y.o. year-old female referred to physical therapy for chronic LB sciatica on the right. She presents with a evolving clinical presentation With worsening pain in the past 3-4 months, decreased tolerance with sitting or any prolonged positioning and impaired sleep patterns.  She has comorbidities memory disorder and chronic fatigue but no personal factors  that may affect her progress in the plan of care.  She presents with decreased lumbar ROM, faulty and mechanically altered posture, sacral dysfunction, pain with most activities.   Functional Limitations: carrying objects, lifting, sleeping, pulling, pushing, uncomfortable because of pain, moving in bed, sitting, stooping and reaching overhead  Goals  Plan Goals: STG (2 weeks)  1.  Pt will be  independent with initial ROM and flexibility exercises for decreased pain.  2.  Pt will demonstrate 50% less right lateral shift for decreased strain on back.   3.  Pt will report 50% improvement in sleep patterns.     LTG (4 weeks)  1.  Pt will be independent with lumbo-sacral stabilization exercises for return to previous activity level.   2.  Pt will demonstrate level SI and negative SI tests for normal mobility with functional tasks  3.  Pt will demonstrate WNL lumbar ROM for ease return to previous activity level.   4.  Pt will report improved perceived function via Oswestry from 18% to 10% or less disability.     Plan  Therapy options: will be seen for skilled physical therapy services  Planned modality interventions: cryotherapy, electrical stimulation/Russian stimulation, thermotherapy (hydrocollator packs) and ultrasound  Planned therapy interventions: abdominal trunk stabilization, flexibility, functional ROM exercises, home exercise program, manual therapy, neuromuscular re-education, soft tissue mobilization, strengthening, stretching and therapeutic activities  Frequency: 2x week  Duration in weeks: 4  Treatment plan discussed with: patient        Manual Therapy:    10      mins  40023;  Therapeutic Exercise:    15     mins  51804;     Neuromuscular Katarina:        mins  45853;    Therapeutic Activity:          mins  68617;     Gait Training:           mins  56606;     Ultrasound:          mins  26865;    Work Hardening                 mins 37961  Iontophoresis                  mins 31240  Electrical Stimulation 15 min     Timed Treatment:   25   mins   Total Treatment:     55   mins    PT SIGNATURE: Jami Almaguer, PT   DATE TREATMENT INITIATED: 12/3/2019    Initial Certification  Certification Period: 3/2/2020  I certify that the therapy services are furnished while this patient is under my care.  The services outlined above are required by this patient, and will be reviewed every 90 days.     PHYSICIAN:  Priya Connors MD      DATE:     Please sign and return via fax to [phone].. Thank you, [facility].

## 2019-12-06 ENCOUNTER — TREATMENT (OUTPATIENT)
Dept: PHYSICAL THERAPY | Facility: CLINIC | Age: 54
End: 2019-12-06

## 2019-12-06 DIAGNOSIS — M54.30 SCIATICA, UNSPECIFIED LATERALITY: ICD-10-CM

## 2019-12-06 DIAGNOSIS — G89.29 CHRONIC RIGHT-SIDED LOW BACK PAIN WITH RIGHT-SIDED SCIATICA: Primary | ICD-10-CM

## 2019-12-06 DIAGNOSIS — M54.41 CHRONIC RIGHT-SIDED LOW BACK PAIN WITH RIGHT-SIDED SCIATICA: Primary | ICD-10-CM

## 2019-12-06 PROCEDURE — 97140 MANUAL THERAPY 1/> REGIONS: CPT | Performed by: PHYSICAL THERAPIST

## 2019-12-06 PROCEDURE — 97110 THERAPEUTIC EXERCISES: CPT | Performed by: PHYSICAL THERAPIST

## 2019-12-06 PROCEDURE — 97014 ELECTRIC STIMULATION THERAPY: CPT | Performed by: PHYSICAL THERAPIST

## 2019-12-06 NOTE — PROGRESS NOTES
Physical Therapy Daily Progress Note      Visit # 2      Subjective Evaluation    History of Present Illness    Subjective comment: During the day it is not as tight.  She feels lsome buring and tightness along the posterior aspect of her right thigh.  She feel rock climging and fell onto a mat on her tail bone.  She went to ER and  was told her tailbone was bruise. she is stll having pain at night but not as bad. Pain  Current pain ratin           Objective   See Exercise, Manual, and Modality Logs for complete treatment.       Assessment & Plan     Assessment  Assessment details: She was much better after MET and stretching.  She is tight on the right from upslip for greater than one year and adaptation.  She had a traumatic upslip that she has developed compensatory patterns and will need to stretch to prevent reversion from SI dysfunction.                      Manual Therapy:    20     mins  19041;  Therapeutic Exercise:    25     mins  68869;     Neuromuscular Katarina:        mins  43477;    Therapeutic Activity:          mins  46847;     Gait Training:           mins  46790;     Ultrasound:          mins  04061;    Work Hardening                 mins 97394  Iontophoresis                  mins 76322  Electrical Stimulation 15 min    Timed Treatment:   45   mins   Total Treatment:     60   mins    Jami Almaguer, PT  Physical Therapist

## 2019-12-09 ENCOUNTER — TREATMENT (OUTPATIENT)
Dept: PHYSICAL THERAPY | Facility: CLINIC | Age: 54
End: 2019-12-09

## 2019-12-09 DIAGNOSIS — G89.29 CHRONIC RIGHT-SIDED LOW BACK PAIN WITH RIGHT-SIDED SCIATICA: Primary | ICD-10-CM

## 2019-12-09 DIAGNOSIS — M54.30 SCIATICA, UNSPECIFIED LATERALITY: ICD-10-CM

## 2019-12-09 DIAGNOSIS — M54.41 CHRONIC RIGHT-SIDED LOW BACK PAIN WITH RIGHT-SIDED SCIATICA: Primary | ICD-10-CM

## 2019-12-09 PROCEDURE — 97140 MANUAL THERAPY 1/> REGIONS: CPT | Performed by: PHYSICAL THERAPIST

## 2019-12-09 PROCEDURE — 97110 THERAPEUTIC EXERCISES: CPT | Performed by: PHYSICAL THERAPIST

## 2019-12-09 PROCEDURE — 97014 ELECTRIC STIMULATION THERAPY: CPT | Performed by: PHYSICAL THERAPIST

## 2019-12-09 NOTE — PROGRESS NOTES
"Physical Therapy Daily Progress Note    Visit # 3    Franci Mandujano reports: \"My right low back is a little stiff and painful this morning, probably a 4/10.  It was tight by yesterday evening but I had done some sweeping and vacuuming around the house as well as carrying dresser drawers to help my son.\"    Subjective Evaluation    Pain  Current pain ratin  Location: (R) SI region           Objective   See Exercise, Manual, and Modality Logs for complete treatment.   *Initiated (R) single leg bridge with (L) SKTC  *Increased isometric hold time with lilo hip add and ER    Assessment & Plan     Assessment  Assessment details: Patient presents to PT this date with mild (L) upslip and (R) anterior innominiate.  She responds positively to manual and muscle energy correction techniques, reporting decreased tightness and improved symptoms from 4/10 to 3/10 in (R) SI region, and demonstrates neutral pelvic alignment prior to initiation of stretches and exercises.  She reports further symptom relief following modalities.  Updated HEP printout is issued to facilitate compliance and recall.        Progress strengthening /stabilization /functional activity. Consider initiation of abdominal bracing.         Manual Therapy:    11     mins  16411;  Therapeutic Exercise:    27     mins  26019;     Neuromuscular Katarina:        mins  26354;    Therapeutic Activity:          mins  87141;     Gait Training:           mins  26064;     Ultrasound:          mins  27712;    Electrical Stimulation:    15     mins  90598 ( );  Dry Needling         mins self-pay    Timed Treatment:   38   mins   Total Treatment:     53   mins    Yadira Justice, PT, DPT  Physical Therapist  [place] License # [id number]    "

## 2019-12-11 ENCOUNTER — TREATMENT (OUTPATIENT)
Dept: PHYSICAL THERAPY | Facility: CLINIC | Age: 54
End: 2019-12-11

## 2019-12-11 DIAGNOSIS — G89.29 CHRONIC RIGHT-SIDED LOW BACK PAIN WITH RIGHT-SIDED SCIATICA: Primary | ICD-10-CM

## 2019-12-11 DIAGNOSIS — M54.41 CHRONIC RIGHT-SIDED LOW BACK PAIN WITH RIGHT-SIDED SCIATICA: Primary | ICD-10-CM

## 2019-12-11 PROCEDURE — 97110 THERAPEUTIC EXERCISES: CPT | Performed by: PHYSICAL THERAPIST

## 2019-12-11 PROCEDURE — 97014 ELECTRIC STIMULATION THERAPY: CPT | Performed by: PHYSICAL THERAPIST

## 2019-12-11 PROCEDURE — 97112 NEUROMUSCULAR REEDUCATION: CPT | Performed by: PHYSICAL THERAPIST

## 2019-12-11 NOTE — PROGRESS NOTES
"Physical Therapy Daily Progress Note    Visit # 4    Franci Mandujano reports: \"Yesterday I was really sore and tight because Monday night I stood for a few hours in the kitchen doing some holiday baking.  This morning I actually feel good though which surprises me because typically mornings are the worst times    Subjective     Objective   See Exercise, Manual, and Modality Logs for complete treatment.   *Introduced sidelying clamshells and hooklying TrA activation    Assessment & Plan     Assessment  Assessment details: Patient presents to PT in neutral pelvic alignment this date and for this reason manual muscle energy techniques are deferred. Educated patient and discussed at length the anatomy and functional role of deep abdominal stabilizers such as transverse abdominis and its role in offering improved functional stability to the lumbar spine during both static and dynamic activities as well as its endurance rather than strength/power role.          Progress strengthening /stabilization /functional activity - consider TrA retraining with march and/or heel slide.         Manual Therapy:         mins  31893;  Therapeutic Exercise:    29     mins  67027;     Neuromuscular Katarina:    9    mins  52418;    Therapeutic Activity:          mins  42641;     Gait Training:           mins  41816;     Ultrasound:          mins  29442;    Electrical Stimulation:    15     mins  78571 ( );  Dry Needling          mins self-pay    Timed Treatment:   38   mins   Total Treatment:     57   mins    Yadira Justice PT, DPT  Physical Therapist  [place] License # [id number]    "

## 2019-12-17 ENCOUNTER — TREATMENT (OUTPATIENT)
Dept: PHYSICAL THERAPY | Facility: CLINIC | Age: 54
End: 2019-12-17

## 2019-12-17 DIAGNOSIS — M54.41 CHRONIC RIGHT-SIDED LOW BACK PAIN WITH RIGHT-SIDED SCIATICA: Primary | ICD-10-CM

## 2019-12-17 DIAGNOSIS — M54.30 SCIATICA, UNSPECIFIED LATERALITY: ICD-10-CM

## 2019-12-17 DIAGNOSIS — G89.29 CHRONIC RIGHT-SIDED LOW BACK PAIN WITH RIGHT-SIDED SCIATICA: Primary | ICD-10-CM

## 2019-12-17 PROCEDURE — 97014 ELECTRIC STIMULATION THERAPY: CPT | Performed by: PHYSICAL THERAPIST

## 2019-12-17 PROCEDURE — 97140 MANUAL THERAPY 1/> REGIONS: CPT | Performed by: PHYSICAL THERAPIST

## 2019-12-17 PROCEDURE — 97112 NEUROMUSCULAR REEDUCATION: CPT | Performed by: PHYSICAL THERAPIST

## 2019-12-17 PROCEDURE — 97110 THERAPEUTIC EXERCISES: CPT | Performed by: PHYSICAL THERAPIST

## 2019-12-17 NOTE — PROGRESS NOTES
Physical Therapy Daily Progress Note      Visit # 5      Subjective Evaluation    History of Present Illness    Subjective comment: Feels pretty good today.  Feels like she is standing straighter and is having less pain. Implementing exercises to improve her posture and pain levels. Pain  Current pain ratin           Objective       Ambulation     Comments   Presented with slightly elevated right iliac crest and mild right anterior innominate via positive standing flexion test on right.     See Exercise, Manual, and Modality Logs for complete treatment.       Assessment & Plan     Assessment  Assessment details: Presents with mild SI dysfunction of right anterior innominate that resolved easily with MET.  She is progressing in lumbar stabilization with good decrease in pain and improved posture and mechanics.                       Manual Therapy:    10      mins  20860;  Therapeutic Exercise:    20     mins  93478;     Neuromuscular Katarina:    10    mins  37358;    Therapeutic Activity:          mins  46921;     Gait Training:           mins  47831;     Ultrasound:          mins  47412;    Work Hardening                 mins 88077  Iontophoresis                  mins 84220  Electrical Stimulation 15 min     Timed Treatment:   40    mins   Total Treatment:     55   mins    Jami Almaguer, PT  Physical Therapist

## 2019-12-19 ENCOUNTER — TREATMENT (OUTPATIENT)
Dept: PHYSICAL THERAPY | Facility: CLINIC | Age: 54
End: 2019-12-19

## 2019-12-19 DIAGNOSIS — M54.41 CHRONIC RIGHT-SIDED LOW BACK PAIN WITH RIGHT-SIDED SCIATICA: Primary | ICD-10-CM

## 2019-12-19 DIAGNOSIS — G89.29 CHRONIC RIGHT-SIDED LOW BACK PAIN WITH RIGHT-SIDED SCIATICA: Primary | ICD-10-CM

## 2019-12-19 PROCEDURE — 97112 NEUROMUSCULAR REEDUCATION: CPT | Performed by: PHYSICAL THERAPIST

## 2019-12-19 PROCEDURE — 97014 ELECTRIC STIMULATION THERAPY: CPT | Performed by: PHYSICAL THERAPIST

## 2019-12-19 PROCEDURE — 97110 THERAPEUTIC EXERCISES: CPT | Performed by: PHYSICAL THERAPIST

## 2019-12-19 NOTE — PROGRESS NOTES
"Physical Therapy Daily Progress Note    Visit # 6    Franci Mandujano reports: \"I'm a little more sore today than I had been.  I have had to sit more than usual the last couple of days to do something on the computer.\"    Subjective Evaluation    Pain  Current pain rating: 3  Location: (R) lumbosacral region           Objective   See Exercise, Manual, and Modality Logs for complete treatment.   *Initiated abdominal bracing with alt LE marches and heel slides    Assessment & Plan     Assessment  Assessment details: Patient presents with mild pelvic asymmetry and rotation ((R) anterior innominate and mild (L) upslip) which responds positively to corrective muscle energy techniques.  She is able to progress neuromuscular activities focusing on deep abdominal retraining with the incorporation of dynamic LE activities, effectively monitoring lumbopelvic motion throughout.  Discussed at length strategies to address sacroiliac dysfunction in both prolonged sitting and standing to include (R) heel digs into the ground in sitting and diagonal weightshifting in standing, both emphasizing (R) hip extension. Patient verbalizes understanding and intent to incorporate these strategies into her workday.        Progress strengthening /stabilization /functional activity         Manual Therapy:    2     mins  05087;  Therapeutic Exercise:    28     mins  55529;     Neuromuscular Katarina:    10    mins  25244;    Therapeutic Activity:          mins  28916;     Gait Training:           mins  02512;     Ultrasound:          mins  63105;    Electrical Stimulation:    15     mins  14808 ( );  Dry Needling          mins self-pay    Timed Treatment:   40   mins   Total Treatment:     58   mins    Yadira Justice PT, DPT  Physical Therapist  [place] License # [id number]    "

## 2019-12-26 ENCOUNTER — TREATMENT (OUTPATIENT)
Dept: PHYSICAL THERAPY | Facility: CLINIC | Age: 54
End: 2019-12-26

## 2019-12-26 DIAGNOSIS — M54.41 CHRONIC RIGHT-SIDED LOW BACK PAIN WITH RIGHT-SIDED SCIATICA: Primary | ICD-10-CM

## 2019-12-26 DIAGNOSIS — G89.29 CHRONIC RIGHT-SIDED LOW BACK PAIN WITH RIGHT-SIDED SCIATICA: Primary | ICD-10-CM

## 2019-12-26 DIAGNOSIS — M54.30 SCIATICA, UNSPECIFIED LATERALITY: ICD-10-CM

## 2019-12-26 PROCEDURE — 97035 APP MDLTY 1+ULTRASOUND EA 15: CPT | Performed by: PHYSICAL THERAPIST

## 2019-12-26 PROCEDURE — 97140 MANUAL THERAPY 1/> REGIONS: CPT | Performed by: PHYSICAL THERAPIST

## 2019-12-26 PROCEDURE — 97110 THERAPEUTIC EXERCISES: CPT | Performed by: PHYSICAL THERAPIST

## 2019-12-26 NOTE — PROGRESS NOTES
Physical Therapy Daily Progress Note      Visit # 7      Subjective Evaluation    History of Present Illness    Subjective comment: Her back is pretty tight on the left.  Has done quite a lot of the [place] holidayPain  Current pain ratin           Objective   See Exercise, Manual, and Modality Logs for complete treatment.       Assessment & Plan     Assessment  Assessment details: Her posture is improved but she still has a mild right lateral shift.  She did have increased pain following all her [place] festivities.  She did present with a right backward sacral torsion that easily resolved with MET.  Her pain level was less and she was more level when she left PT today.                      Manual Therapy:    21     mins  86215;  Therapeutic Exercise:    15     mins  68831;     Neuromuscular aKtarina:        mins  94510;    Therapeutic Activity:          mins  61456;     Gait Training:           mins  14856;     Ultrasound:     10     mins  97711;    Work Hardening                 mins 92685  Iontophoresis                  mins 81184    Timed Treatment:   46   mins   Total Treatment:     46   mins    Jami Almaguer, PT  Physical Therapist

## 2020-01-07 ENCOUNTER — TREATMENT (OUTPATIENT)
Dept: PHYSICAL THERAPY | Facility: CLINIC | Age: 55
End: 2020-01-07

## 2020-01-07 DIAGNOSIS — M54.41 CHRONIC RIGHT-SIDED LOW BACK PAIN WITH RIGHT-SIDED SCIATICA: Primary | ICD-10-CM

## 2020-01-07 DIAGNOSIS — G89.29 CHRONIC RIGHT-SIDED LOW BACK PAIN WITH RIGHT-SIDED SCIATICA: Primary | ICD-10-CM

## 2020-01-07 DIAGNOSIS — M54.30 SCIATICA, UNSPECIFIED LATERALITY: ICD-10-CM

## 2020-01-07 PROCEDURE — 97110 THERAPEUTIC EXERCISES: CPT | Performed by: PHYSICAL THERAPIST

## 2020-01-07 PROCEDURE — 97140 MANUAL THERAPY 1/> REGIONS: CPT | Performed by: PHYSICAL THERAPIST

## 2020-01-07 PROCEDURE — 97014 ELECTRIC STIMULATION THERAPY: CPT | Performed by: PHYSICAL THERAPIST

## 2020-01-07 NOTE — PROGRESS NOTES
Physical Therapy Daily Progress Note      Visit # 8      Subjective Evaluation    History of Present Illness    Subjective comment: Was stiffness over the weekend. Feels like she is a little more straight.Pain is still worse in the evenings. Over the weekend playing with her new puppy and she slipped on the floor while wearing slipper socks. Pain  Current pain rating: 3           Objective   See Exercise, Manual, and Modality Logs for complete treatment.       Assessment & Plan     Assessment  Assessment details: Decreased pain after manual work and exercises.  She continues to display a mild SI dysfunction that easily resolves with MET and exercise.  She does have lumbar paraspinal spasm on the right but is less than at initial evaluation.  Her posture and right lateral shift is much improved from intitial evaluation.                    Manual Therapy:    20     mins  45693;  Therapeutic Exercise:    25     mins  54917;     Neuromuscular Katarina:        mins  07077;    Therapeutic Activity:          mins  07999;     Gait Training:           mins  47536;     Ultrasound:          mins  00444;    Work Hardening                 mins 89845  Iontophoresis                  mins 46968  Electrical Stimulation 15 min    Timed Treatment:   45   mins   Total Treatment:     60   mins    Jami Almaguer, PT  Physical Therapist

## 2020-01-22 ENCOUNTER — OFFICE VISIT (OUTPATIENT)
Dept: INTERNAL MEDICINE | Facility: CLINIC | Age: 55
End: 2020-01-22

## 2020-01-22 VITALS
BODY MASS INDEX: 26.7 KG/M2 | OXYGEN SATURATION: 98 % | DIASTOLIC BLOOD PRESSURE: 70 MMHG | RESPIRATION RATE: 15 BRPM | TEMPERATURE: 98.2 F | WEIGHT: 156.4 LBS | HEIGHT: 64 IN | HEART RATE: 69 BPM | SYSTOLIC BLOOD PRESSURE: 110 MMHG

## 2020-01-22 DIAGNOSIS — Z00.00 ANNUAL PHYSICAL EXAM: Primary | ICD-10-CM

## 2020-01-22 DIAGNOSIS — Z12.4 CERVICAL CANCER SCREENING: ICD-10-CM

## 2020-01-22 DIAGNOSIS — Z12.31 ENCOUNTER FOR SCREENING MAMMOGRAM FOR BREAST CANCER: ICD-10-CM

## 2020-01-22 DIAGNOSIS — Z12.11 COLON CANCER SCREENING: ICD-10-CM

## 2020-01-22 LAB
ALBUMIN SERPL-MCNC: 4.5 G/DL (ref 3.5–5.2)
ALBUMIN/GLOB SERPL: 1.8 G/DL
ALP SERPL-CCNC: 60 U/L (ref 39–117)
ALT SERPL W P-5'-P-CCNC: 14 U/L (ref 1–33)
ANION GAP SERPL CALCULATED.3IONS-SCNC: 12 MMOL/L (ref 5–15)
AST SERPL-CCNC: 22 U/L (ref 1–32)
BACTERIA UR QL AUTO: ABNORMAL /HPF
BASOPHILS # BLD AUTO: 0.02 10*3/MM3 (ref 0–0.2)
BASOPHILS NFR BLD AUTO: 0.5 % (ref 0–1.5)
BILIRUB SERPL-MCNC: 0.2 MG/DL (ref 0.2–1.2)
BILIRUB UR QL STRIP: NEGATIVE
BUN BLD-MCNC: 19 MG/DL (ref 6–20)
BUN/CREAT SERPL: 21.6 (ref 7–25)
CALCIUM SPEC-SCNC: 9.5 MG/DL (ref 8.6–10.5)
CHLORIDE SERPL-SCNC: 101 MMOL/L (ref 98–107)
CHOLEST SERPL-MCNC: 188 MG/DL (ref 0–200)
CLARITY UR: CLEAR
CO2 SERPL-SCNC: 28 MMOL/L (ref 22–29)
COLOR UR: YELLOW
CREAT BLD-MCNC: 0.88 MG/DL (ref 0.57–1)
DEPRECATED RDW RBC AUTO: 44.8 FL (ref 37–54)
EOSINOPHIL # BLD AUTO: 0.04 10*3/MM3 (ref 0–0.4)
EOSINOPHIL NFR BLD AUTO: 1 % (ref 0.3–6.2)
ERYTHROCYTE [DISTWIDTH] IN BLOOD BY AUTOMATED COUNT: 13.2 % (ref 12.3–15.4)
GFR SERPL CREATININE-BSD FRML MDRD: 67 ML/MIN/1.73
GLOBULIN UR ELPH-MCNC: 2.5 GM/DL
GLUCOSE BLD-MCNC: 93 MG/DL (ref 65–99)
GLUCOSE UR STRIP-MCNC: NEGATIVE MG/DL
HCT VFR BLD AUTO: 38.9 % (ref 34–46.6)
HDLC SERPL-MCNC: 69 MG/DL (ref 40–60)
HEMOCCULT STL QL IA: NEGATIVE
HGB BLD-MCNC: 12.4 G/DL (ref 12–15.9)
HGB UR QL STRIP.AUTO: ABNORMAL
HYALINE CASTS UR QL AUTO: ABNORMAL /LPF
KETONES UR QL STRIP: NEGATIVE
LDLC SERPL CALC-MCNC: 107 MG/DL (ref 0–100)
LDLC/HDLC SERPL: 1.55 {RATIO}
LEUKOCYTE ESTERASE UR QL STRIP.AUTO: NEGATIVE
LYMPHOCYTES # BLD AUTO: 1.53 10*3/MM3 (ref 0.7–3.1)
LYMPHOCYTES NFR BLD AUTO: 39.7 % (ref 19.6–45.3)
MCH RBC QN AUTO: 30.7 PG (ref 26.6–33)
MCHC RBC AUTO-ENTMCNC: 31.9 G/DL (ref 31.5–35.7)
MCV RBC AUTO: 96.3 FL (ref 79–97)
MONOCYTES # BLD AUTO: 0.4 10*3/MM3 (ref 0.1–0.9)
MONOCYTES NFR BLD AUTO: 10.4 % (ref 5–12)
NEUTROPHILS # BLD AUTO: 1.86 10*3/MM3 (ref 1.7–7)
NEUTROPHILS NFR BLD AUTO: 48.4 % (ref 42.7–76)
NITRITE UR QL STRIP: NEGATIVE
PH UR STRIP.AUTO: 7 [PH] (ref 5–8)
PLATELET # BLD AUTO: 177 10*3/MM3 (ref 140–450)
PMV BLD AUTO: 9.2 FL (ref 6–12)
POTASSIUM BLD-SCNC: 4.5 MMOL/L (ref 3.5–5.2)
PROT SERPL-MCNC: 7 G/DL (ref 6–8.5)
PROT UR QL STRIP: NEGATIVE
RBC # BLD AUTO: 4.04 10*6/MM3 (ref 3.77–5.28)
RBC # UR: ABNORMAL /HPF
REF LAB TEST METHOD: ABNORMAL
SODIUM BLD-SCNC: 141 MMOL/L (ref 136–145)
SP GR UR STRIP: 1.02 (ref 1–1.03)
SQUAMOUS #/AREA URNS HPF: ABNORMAL /HPF
TRIGL SERPL-MCNC: 61 MG/DL (ref 0–150)
UROBILINOGEN UR QL STRIP: ABNORMAL
VLDLC SERPL-MCNC: 12.2 MG/DL (ref 5–40)
WBC NRBC COR # BLD: 3.85 10*3/MM3 (ref 3.4–10.8)
WBC UR QL AUTO: ABNORMAL /HPF

## 2020-01-22 PROCEDURE — 80053 COMPREHEN METABOLIC PANEL: CPT | Performed by: INTERNAL MEDICINE

## 2020-01-22 PROCEDURE — 81001 URINALYSIS AUTO W/SCOPE: CPT | Performed by: INTERNAL MEDICINE

## 2020-01-22 PROCEDURE — 36415 COLL VENOUS BLD VENIPUNCTURE: CPT | Performed by: INTERNAL MEDICINE

## 2020-01-22 PROCEDURE — 80061 LIPID PANEL: CPT | Performed by: INTERNAL MEDICINE

## 2020-01-22 PROCEDURE — 99396 PREV VISIT EST AGE 40-64: CPT | Performed by: INTERNAL MEDICINE

## 2020-01-22 PROCEDURE — 82274 ASSAY TEST FOR BLOOD FECAL: CPT | Performed by: INTERNAL MEDICINE

## 2020-01-22 PROCEDURE — 85025 COMPLETE CBC W/AUTO DIFF WBC: CPT | Performed by: INTERNAL MEDICINE

## 2020-01-22 NOTE — PATIENT INSTRUCTIONS
Health Maintenance, Female  Adopting a healthy lifestyle and getting preventive care can go a long way to promote health and wellness. Talk with your health care provider about what schedule of regular examinations is right for you. This is a good chance for you to check in with your provider about disease prevention and staying healthy.  In between checkups, there are plenty of things you can do on your own. Experts have done a lot of research about which lifestyle changes and preventive measures are most likely to keep you healthy. Ask your health care provider for more information.  Weight and diet  Eat a healthy diet  · Be sure to include plenty of vegetables, fruits, low-fat dairy products, and lean protein.  · Do not eat a lot of foods high in solid fats, added sugars, or salt.  · Get regular exercise. This is one of the most important things you can do for your health.  ? Most adults should exercise for at least 150 minutes each week. The exercise should increase your heart rate and make you sweat (moderate-intensity exercise).  ? Most adults should also do strengthening exercises at least twice a week. This is in addition to the moderate-intensity exercise.  Maintain a healthy weight  · Body mass index (BMI) is a measurement that can be used to identify possible weight problems. It estimates body fat based on height and weight. Your health care provider can help determine your BMI and help you achieve or maintain a healthy weight.  · For females 20 years of age and older:  ? A BMI below 18.5 is considered underweight.  ? A BMI of 18.5 to 24.9 is normal.  ? A BMI of 25 to 29.9 is considered overweight.  ? A BMI of 30 and above is considered obese.  Watch levels of cholesterol and blood lipids  · You should start having your blood tested for lipids and cholesterol at 20 years of age, then have this test every 5 years.  · You may need to have your cholesterol levels checked more often if:  ? Your lipid or  cholesterol levels are high.  ? You are older than 50 years of age.  ? You are at high risk for heart disease.  Cancer screening  Lung Cancer  · Lung cancer screening is recommended for adults 55-80 years old who are at high risk for lung cancer because of a history of smoking.  · A yearly low-dose CT scan of the lungs is recommended for people who:  ? Currently smoke.  ? Have quit within the past 15 years.  ? Have at least a 30-pack-year history of smoking. A pack year is smoking an average of one pack of cigarettes a day for 1 year.  · Yearly screening should continue until it has been 15 years since you quit.  · Yearly screening should stop if you develop a health problem that would prevent you from having lung cancer treatment.  Breast Cancer  · Practice breast self-awareness. This means understanding how your breasts normally appear and feel.  · It also means doing regular breast self-exams. Let your health care provider know about any changes, no matter how small.  · If you are in your 20s or 30s, you should have a clinical breast exam (CBE) by a health care provider every 1-3 years as part of a regular health exam.  · If you are 40 or older, have a CBE every year. Also consider having a breast X-ray (mammogram) every year.  · If you have a family history of breast cancer, talk to your health care provider about genetic screening.  · If you are at high risk for breast cancer, talk to your health care provider about having an MRI and a mammogram every year.  · Breast cancer gene (BRCA) assessment is recommended for women who have family members with BRCA-related cancers. BRCA-related cancers include:  ? Breast.  ? Ovarian.  ? Tubal.  ? Peritoneal cancers.  · Results of the assessment will determine the need for genetic counseling and BRCA1 and BRCA2 testing.  Cervical Cancer  Your health care provider may recommend that you be screened regularly for cancer of the pelvic organs (ovaries, uterus, and vagina).  This screening involves a pelvic examination, including checking for microscopic changes to the surface of your cervix (Pap test). You may be encouraged to have this screening done every 3 years, beginning at age 21.  · For women ages 30-65, health care providers may recommend pelvic exams and Pap testing every 3 years, or they may recommend the Pap and pelvic exam, combined with testing for human papilloma virus (HPV), every 5 years. Some types of HPV increase your risk of cervical cancer. Testing for HPV may also be done on women of any age with unclear Pap test results.  · Other health care providers may not recommend any screening for nonpregnant women who are considered low risk for pelvic cancer and who do not have symptoms. Ask your health care provider if a screening pelvic exam is right for you.  · If you have had past treatment for cervical cancer or a condition that could lead to cancer, you need Pap tests and screening for cancer for at least 20 years after your treatment. If Pap tests have been discontinued, your risk factors (such as having a new sexual partner) need to be reassessed to determine if screening should resume. Some women have medical problems that increase the chance of getting cervical cancer. In these cases, your health care provider may recommend more frequent screening and Pap tests.  Colorectal Cancer  · This type of cancer can be detected and often prevented.  · Routine colorectal cancer screening usually begins at 50 years of age and continues through 75 years of age.  · Your health care provider may recommend screening at an earlier age if you have risk factors for colon cancer.  · Your health care provider may also recommend using home test kits to check for hidden blood in the stool.  · A small camera at the end of a tube can be used to examine your colon directly (sigmoidoscopy or colonoscopy). This is done to check for the earliest forms of colorectal cancer.  · Routine  screening usually begins at age 50.  · Direct examination of the colon should be repeated every 5-10 years through 75 years of age. However, you may need to be screened more often if early forms of precancerous polyps or small growths are found.  Skin Cancer  · Check your skin from head to toe regularly.  · Tell your health care provider about any new moles or changes in moles, especially if there is a change in a mole's shape or color.  · Also tell your health care provider if you have a mole that is larger than the size of a pencil eraser.  · Always use sunscreen. Apply sunscreen liberally and repeatedly throughout the day.  · Protect yourself by wearing long sleeves, pants, a wide-brimmed hat, and sunglasses whenever you are outside.  Heart disease, diabetes, and high blood pressure  · High blood pressure causes heart disease and increases the risk of stroke. High blood pressure is more likely to develop in:  ? People who have blood pressure in the high end of the normal range (130-139/85-89 mm Hg).  ? People who are overweight or obese.  ? People who are .  · If you are 18-39 years of age, have your blood pressure checked every 3-5 years. If you are 40 years of age or older, have your blood pressure checked every year. You should have your blood pressure measured twice--once when you are at a hospital or clinic, and once when you are not at a hospital or clinic. Record the average of the two measurements. To check your blood pressure when you are not at a hospital or clinic, you can use:  ? An automated blood pressure machine at a pharmacy.  ? A home blood pressure monitor.  · If you are between 55 years and 79 years old, ask your health care provider if you should take aspirin to prevent strokes.  · Have regular diabetes screenings. This involves taking a blood sample to check your fasting blood sugar level.  ? If you are at a normal weight and have a low risk for diabetes, have this test once  every three years after 45 years of age.  ? If you are overweight and have a high risk for diabetes, consider being tested at a younger age or more often.  Preventing infection  Hepatitis B  · If you have a higher risk for hepatitis B, you should be screened for this virus. You are considered at high risk for hepatitis B if:  ? You were born in a country where hepatitis B is common. Ask your health care provider which countries are considered high risk.  ? Your parents were born in a high-risk country, and you have not been immunized against hepatitis B (hepatitis B vaccine).  ? You have HIV or AIDS.  ? You use needles to inject street drugs.  ? You live with someone who has hepatitis B.  ? You have had sex with someone who has hepatitis B.  ? You get hemodialysis treatment.  ? You take certain medicines for conditions, including cancer, organ transplantation, and autoimmune conditions.  Hepatitis C  · Blood testing is recommended for:  ? Everyone born from 1945 through 1965.  ? Anyone with known risk factors for hepatitis C.  Sexually transmitted infections (STIs)  · You should be screened for sexually transmitted infections (STIs) including gonorrhea and chlamydia if:  ? You are sexually active and are younger than 24 years of age.  ? You are older than 24 years of age and your health care provider tells you that you are at risk for this type of infection.  ? Your sexual activity has changed since you were last screened and you are at an increased risk for chlamydia or gonorrhea. Ask your health care provider if you are at risk.  · If you do not have HIV, but are at risk, it may be recommended that you take a prescription medicine daily to prevent HIV infection. This is called pre-exposure prophylaxis (PrEP). You are considered at risk if:  ? You are sexually active and do not regularly use condoms or know the HIV status of your partner(s).  ? You take drugs by injection.  ? You are sexually active with a partner  who has HIV.  Talk with your health care provider about whether you are at high risk of being infected with HIV. If you choose to begin PrEP, you should first be tested for HIV. You should then be tested every 3 months for as long as you are taking PrEP.  Pregnancy  · If you are premenopausal and you may become pregnant, ask your health care provider about preconception counseling.  · If you may become pregnant, take 400 to 800 micrograms (mcg) of folic acid every day.  · If you want to prevent pregnancy, talk to your health care provider about birth control (contraception).  Osteoporosis and menopause  · Osteoporosis is a disease in which the bones lose minerals and strength with aging. This can result in serious bone fractures. Your risk for osteoporosis can be identified using a bone density scan.  · If you are 65 years of age or older, or if you are at risk for osteoporosis and fractures, ask your health care provider if you should be screened.  · Ask your health care provider whether you should take a calcium or vitamin D supplement to lower your risk for osteoporosis.  · Menopause may have certain physical symptoms and risks.  · Hormone replacement therapy may reduce some of these symptoms and risks.  Talk to your health care provider about whether hormone replacement therapy is right for you.  Follow these instructions at home:  · Schedule regular health, dental, and eye exams.  · Stay current with your immunizations.  · Do not use any tobacco products including cigarettes, chewing tobacco, or electronic cigarettes.  · If you are pregnant, do not drink alcohol.  · If you are breastfeeding, limit how much and how often you drink alcohol.  · Limit alcohol intake to no more than 1 drink per day for nonpregnant women. One drink equals 12 ounces of beer, 5 ounces of wine, or 1½ ounces of hard liquor.  · Do not use street drugs.  · Do not share needles.  · Ask your health care provider for help if you need support  or information about quitting drugs.  · Tell your health care provider if you often feel depressed.  · Tell your health care provider if you have ever been abused or do not feel safe at home.  This information is not intended to replace advice given to you by your health care provider. Make sure you discuss any questions you have with your health care provider.  Document Released: 07/02/2012 Document Revised: 05/25/2017 Document Reviewed: 09/20/2016  [employer] Interactive Patient Education © 2019 [employer]

## 2020-01-22 NOTE — PROGRESS NOTES
"Subjective   Franci Mandujano is a 54 y.o. female here for   Chief Complaint   Patient presents with   • Annual Exam   .    Vitals:    01/22/20 1137   BP: 110/70   BP Location: Left arm   Patient Position: Sitting   Cuff Size: Adult   Pulse: 69   Resp: 15   Temp: 98.2 °F (36.8 °C)   TempSrc: Temporal   SpO2: 98%   Weight: 70.9 kg (156 lb 6.4 oz)   Height: 163.2 cm (64.25\")       Body mass index is 26.64 kg/m².    History of Present Illness     The following portions of the patient's history were reviewed and updated as appropriate: allergies, current medications, past social history and problem list.    Review of Systems   Constitutional: Negative for appetite change, chills, fatigue, fever and unexpected weight change.   HENT: Positive for congestion, ear pain (pressure) and sinus pressure. Negative for mouth sores, sore throat, tinnitus, trouble swallowing and voice change.    Eyes: Negative for pain and visual disturbance.   Respiratory: Positive for cough (occ). Negative for choking, shortness of breath and wheezing.    Cardiovascular: Negative for chest pain, palpitations and leg swelling.   Gastrointestinal: Positive for constipation. Negative for abdominal pain, blood in stool, diarrhea, nausea and vomiting.   Endocrine: Positive for polyphagia. Negative for cold intolerance, heat intolerance, polydipsia and polyuria.   Genitourinary: Positive for enuresis (1x). Negative for difficulty urinating, dysuria, flank pain, frequency, hematuria, urgency and vaginal bleeding.   Musculoskeletal: Positive for back pain (LBP is getting better) and neck stiffness. Negative for arthralgias, gait problem, joint swelling and myalgias.   Skin: Negative for color change and rash.   Allergic/Immunologic: Positive for environmental allergies. Negative for food allergies and immunocompromised state.   Neurological: Positive for headaches (sinus). Negative for dizziness, tremors, seizures, syncope, speech difficulty, weakness and " numbness.   Hematological: Negative for adenopathy. Does not bruise/bleed easily.   Psychiatric/Behavioral: Negative for agitation, confusion, decreased concentration, dysphoric mood, sleep disturbance and suicidal ideas. The patient is not nervous/anxious.        Objective   Physical Exam   Constitutional: She appears well-developed and well-nourished.   HENT:   Right Ear: Hearing, tympanic membrane, external ear and ear canal normal.   Left Ear: Hearing, tympanic membrane, external ear and ear canal normal.   Nose: Right sinus exhibits no maxillary sinus tenderness and no frontal sinus tenderness. Left sinus exhibits no maxillary sinus tenderness and no frontal sinus tenderness.   Eyes: Pupils are equal, round, and reactive to light. Conjunctivae, EOM and lids are normal.   Neck: Trachea normal. Neck supple. No JVD present. Carotid bruit is not present. No tracheal deviation present. No thyroid mass and no thyromegaly present.   Cardiovascular: Normal rate, regular rhythm, S1 normal and S2 normal. Exam reveals no gallop and no friction rub.   No murmur heard.  Pulses:       Carotid pulses are 2+ on the right side, and 2+ on the left side.       Radial pulses are 2+ on the right side, and 2+ on the left side.        Dorsalis pedis pulses are 2+ on the right side, and 2+ on the left side.        Posterior tibial pulses are 2+ on the right side, and 2+ on the left side.   Pulmonary/Chest: Effort normal and breath sounds normal. Chest wall is not dull to percussion. Right breast exhibits no inverted nipple, no mass, no nipple discharge, no skin change and no tenderness. Left breast exhibits no inverted nipple, no mass, no nipple discharge, no skin change and no tenderness.   Abdominal: Soft. Normal aorta and bowel sounds are normal. She exhibits no abdominal bruit. There is no hepatosplenomegaly. There is no tenderness. There is no rebound and no guarding. No hernia.   Genitourinary: Rectum normal, vagina normal and  uterus normal. Rectal exam shows guaiac negative stool. No labial fusion. There is no rash, tenderness, lesion or injury on the right labia. There is no rash, tenderness, lesion or injury on the left labia. Cervix exhibits no discharge. Right adnexum displays no mass, no tenderness and no fullness. Left adnexum displays no mass, no tenderness and no fullness.   Musculoskeletal: Normal range of motion. She exhibits no edema.   Lymphadenopathy:     She has no cervical adenopathy.     She has no axillary adenopathy.        Right: No supraclavicular adenopathy present.        Left: No supraclavicular adenopathy present.   Neurological: She is alert. She has normal strength. No cranial nerve deficit or sensory deficit. She displays a negative Romberg sign.   Reflex Scores:       Patellar reflexes are 2+ on the right side and 2+ on the left side.  Skin: Skin is warm and dry.   Nursing note and vitals reviewed.      Assessment/Plan   Diagnoses and all orders for this visit:    Annual physical exam  -     CBC Auto Differential; Future  -     Comprehensive Metabolic Panel; Future  -     Lipid Panel; Future  -     Urinalysis With Microscopic If Indicated (No Culture) - Urine, Clean Catch; Future  -     CBC Auto Differential  -     Comprehensive Metabolic Panel  -     Lipid Panel  -     Urinalysis With Microscopic If Indicated (No Culture) - Urine, Clean Catch    Encounter for screening mammogram for breast cancer    Cervical cancer screening  -     Pap IG, HPV-hr; Future    Colon cancer screening  -     POC FECAL OCCULT BLOOD BY IMMUNOASSAY     She has allergic rhinitis frequently - need nasonex daily & mucinex bid - call if no better or worse.

## 2020-01-28 ENCOUNTER — TREATMENT (OUTPATIENT)
Dept: PHYSICAL THERAPY | Facility: CLINIC | Age: 55
End: 2020-01-28

## 2020-01-28 DIAGNOSIS — M54.30 SCIATICA, UNSPECIFIED LATERALITY: ICD-10-CM

## 2020-01-28 DIAGNOSIS — M54.41 CHRONIC RIGHT-SIDED LOW BACK PAIN WITH RIGHT-SIDED SCIATICA: Primary | ICD-10-CM

## 2020-01-28 DIAGNOSIS — G89.29 CHRONIC RIGHT-SIDED LOW BACK PAIN WITH RIGHT-SIDED SCIATICA: Primary | ICD-10-CM

## 2020-01-28 LAB
CHROM ANALY OVERALL INTERP-IMP: NORMAL
CONV .: NORMAL
CONV PERFORMED BY:: NORMAL
DX ICD CODE: NORMAL
HIV 1 & 2 AB SER-IMP: NORMAL
HPV I/H RISK 1 DNA CVX QL PROBE+SIG AMP: NEGATIVE
REF LAB TEST METHOD: NORMAL
STAT OF ADQ CVX/VAG CYTO-IMP: NORMAL

## 2020-01-28 PROCEDURE — 97530 THERAPEUTIC ACTIVITIES: CPT | Performed by: PHYSICAL THERAPIST

## 2020-01-28 PROCEDURE — 97110 THERAPEUTIC EXERCISES: CPT | Performed by: PHYSICAL THERAPIST

## 2020-01-28 NOTE — PROGRESS NOTES
Physical Therapy Daily Progress Note      Visit # 9      Subjective Evaluation    History of Present Illness    Subjective comment: Doing the stretches has made a big difference. If she does the stretches every day she does much better.  She is a little stiff first thing in the morning and it really helps.Pain  Current pain ratin           Objective       Active Range of Motion     Additional Active Range of Motion Details  Lumbar AROM %  Flexion 100%  Extension 100%  Right Lateral Flexion 100%  Left Lateral Flexion 100%      Tests     Additional Tests Details  Standing flexion (-)  Sitting flexion (-)     See Exercise, Manual, and Modality Logs for complete treatment.       Assessment & Plan     Assessment  Assessment details: Franci Mandujano was seen for 9 physical therapy sessions for LBP and SI dysfunction.  Treatment included therapeutic exercise, manual therapy, therapeutic activity, neuro-muscular retraining , ultrasound, electrical stimulation  and patient education with home exercise program . Progress to physical therapy goals was excellent.  She was discharged to an independent [facility] and provided patient education to self-manage condition.     Goals  Plan Goals: STG (2 weeks)  1.  Pt will be independent with initial ROM and flexibility exercises for decreased pain.  MET  2.  Pt will demonstrate 50% less right lateral shift for decreased strain on back. MET  3.  Pt will report 50% improvement in sleep patterns. MET She is now sleeping well..    LTG (4 weeks)  1.  Pt will be independent with lumbo-sacral stabilization exercises for return to previous activity level. MET  2.  Pt will demonstrate level SI and negative SI tests for normal mobility with functional tasks.  MET  3.  Pt will demonstrate WNL lumbar ROM for ease return to previous activity level. MET  4.  Pt will report improved perceived function via Oswestry from 18% to 10% or less disability. MET 0% disability                        Manual  Therapy:         mins  33708;  Therapeutic Exercise:    30     mins  66193;     Neuromuscular Katarina:        mins  12001;    Therapeutic Activity:     10     mins  51665;     Gait Training:           mins  75675;     Ultrasound:          mins  67984;    Work Hardening                 mins 18986  Iontophoresis                  mins 05064    Timed Treatment:   40   mins   Total Treatment:     40   mins    Jami Almaguer, PT  Physical Therapist

## 2020-08-07 ENCOUNTER — OFFICE VISIT (OUTPATIENT)
Dept: INTERNAL MEDICINE | Facility: CLINIC | Age: 55
End: 2020-08-07

## 2020-08-07 VITALS
OXYGEN SATURATION: 98 % | DIASTOLIC BLOOD PRESSURE: 74 MMHG | SYSTOLIC BLOOD PRESSURE: 112 MMHG | WEIGHT: 140 LBS | HEART RATE: 68 BPM | HEIGHT: 64 IN | BODY MASS INDEX: 23.9 KG/M2

## 2020-08-07 DIAGNOSIS — Z78.0 MENOPAUSE: ICD-10-CM

## 2020-08-07 DIAGNOSIS — Z23 NEED FOR TDAP VACCINATION: ICD-10-CM

## 2020-08-07 DIAGNOSIS — Z12.31 ENCOUNTER FOR SCREENING MAMMOGRAM FOR BREAST CANCER: ICD-10-CM
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
## 2020-08-07 PROCEDURE — 99214 OFFICE O/P EST MOD 30 MIN: CPT | Performed by: INTERNAL MEDICINE

## 2020-08-07 PROCEDURE — 90471 IMMUNIZATION ADMIN: CPT | Performed by: INTERNAL MEDICINE

## 2020-08-07 PROCEDURE — 90715 TDAP VACCINE 7 YRS/> IM: CPT | Performed by: INTERNAL MEDICINE

## 2020-08-07 RX ORDER — DOXYCYCLINE 100 MG/1
100 CAPSULE ORAL 2 TIMES DAILY
Qty: 20 CAPSULE | Refills: 0 | Status: SHIPPED | OUTPATIENT
Start: 2020-08-07 | End: 2021-02-15

## 2020-08-07 NOTE — PROGRESS NOTES
"Answers for HPI/ROS submitted by the patient on 8/6/2020   What is the primary reason for your visit?: Physical  Subjective   Franci Mandujano is a 55 y.o. female here for   Chief Complaint   Patient presents with   • Follow-up     6 month   • Skin Problem   .    Vitals:    08/07/20 1413   BP: 112/74   BP Location: Left arm   Patient Position: Sitting   Cuff Size: Adult   Pulse: 68   SpO2: 98%   Weight: 63.5 kg (140 lb)   Height: 163.2 cm (64.25\")       Body mass index is 23.84 kg/m².    Skin Problem   This is a new problem. The current episode started in the past 7 days. The problem occurs constantly. The problem has been unchanged. Pertinent negatives include no chest pain, chills, coughing, fatigue or fever. The treatment provided no relief.        The following portions of the patient's history were reviewed and updated as appropriate: allergies, current medications, past social history and problem list.    Review of Systems   Constitutional: Negative for chills, fatigue and fever.   Respiratory: Negative for cough, shortness of breath and wheezing.    Cardiovascular: Negative for chest pain, palpitations and leg swelling.   Skin: Positive for wound (several insect bites -also bit by tick).   Psychiatric/Behavioral: Negative for dysphoric mood and sleep disturbance. The patient is not nervous/anxious.        Objective   Physical Exam   Constitutional: She appears well-developed and well-nourished. No distress.   Cardiovascular: Normal rate, regular rhythm and normal heart sounds.   Pulmonary/Chest: No respiratory distress. She has no wheezes. She has no rales. She exhibits no tenderness.   Musculoskeletal: She exhibits no edema.   Skin: Rash noted.   Psychiatric: She has a normal mood and affect. Her behavior is normal.   Nursing note and vitals reviewed.    +several left LE insect bites with surrounding erythema of left LE    Assessment/Plan   Diagnoses and all orders for this visit:    Tick bite, initial " encounter  Comments:  need to avoid bites - call if redness worsens or does not resolve  Orders:  -     doxycycline (MONODOX) 100 MG capsule; Take 1 capsule by mouth 2 (Two) Times a Day.    Encounter for screening mammogram for breast cancer  -     Mammo Screening Bilateral With CAD; Future    Menopause  -     DEXA Bone Density Axial; Future    Need for Tdap vaccination  -     Tdap Vaccine Greater Than or Equal To 8yo IM

## 2021-02-15 ENCOUNTER — OFFICE VISIT (OUTPATIENT)
Dept: INTERNAL MEDICINE | Facility: CLINIC | Age: 56
End: 2021-02-15

## 2021-02-15 ENCOUNTER — APPOINTMENT (OUTPATIENT)
Dept: WOMENS IMAGING | Facility: HOSPITAL | Age: 56
End: 2021-02-15

## 2021-02-15 VITALS
HEIGHT: 64 IN | TEMPERATURE: 97.9 F | DIASTOLIC BLOOD PRESSURE: 71 MMHG | BODY MASS INDEX: 23.22 KG/M2 | WEIGHT: 136 LBS | OXYGEN SATURATION: 98 % | RESPIRATION RATE: 15 BRPM | HEART RATE: 62 BPM | SYSTOLIC BLOOD PRESSURE: 116 MMHG

## 2021-02-15 DIAGNOSIS — Z00.00 ANNUAL PHYSICAL EXAM: Primary | ICD-10-CM

## 2021-02-15 DIAGNOSIS — M54.30 SCIATICA, UNSPECIFIED LATERALITY: ICD-10-CM

## 2021-02-15 DIAGNOSIS — K59.01 SLOW TRANSIT CONSTIPATION: ICD-10-CM

## 2021-02-15 DIAGNOSIS — Z78.0 MENOPAUSE: ICD-10-CM

## 2021-02-15 DIAGNOSIS — Z12.31 ENCOUNTER FOR SCREENING MAMMOGRAM FOR BREAST CANCER: ICD-10-CM

## 2021-02-15 DIAGNOSIS — Z12.11 COLON CANCER SCREENING: ICD-10-CM

## 2021-02-15 DIAGNOSIS — J30.2 SEASONAL ALLERGIC RHINITIS, UNSPECIFIED TRIGGER: ICD-10-CM

## 2021-02-15 LAB
ALBUMIN SERPL-MCNC: 4.3 G/DL (ref 3.5–5.2)
ALBUMIN/GLOB SERPL: 2 G/DL
ALP SERPL-CCNC: 60 U/L (ref 39–117)
ALT SERPL-CCNC: 13 U/L (ref 1–33)
APPEARANCE UR: CLEAR
AST SERPL-CCNC: 21 U/L (ref 1–32)
BACTERIA #/AREA URNS HPF: ABNORMAL /HPF
BASOPHILS # BLD AUTO: 0.03 10*3/MM3 (ref 0–0.2)
BASOPHILS NFR BLD AUTO: 0.7 % (ref 0–1.5)
BILIRUB SERPL-MCNC: 0.3 MG/DL (ref 0–1.2)
BILIRUB UR QL STRIP: NEGATIVE
BUN SERPL-MCNC: 21 MG/DL (ref 6–20)
BUN/CREAT SERPL: 26.6 (ref 7–25)
CALCIUM SERPL-MCNC: 9.3 MG/DL (ref 8.6–10.5)
CASTS URNS MICRO: ABNORMAL
CHLORIDE SERPL-SCNC: 102 MMOL/L (ref 98–107)
CHOLEST SERPL-MCNC: 167 MG/DL (ref 0–200)
CO2 SERPL-SCNC: 30.9 MMOL/L (ref 22–29)
COLOR UR: YELLOW
CREAT SERPL-MCNC: 0.79 MG/DL (ref 0.57–1)
EOSINOPHIL # BLD AUTO: 0.05 10*3/MM3 (ref 0–0.4)
EOSINOPHIL NFR BLD AUTO: 1.2 % (ref 0.3–6.2)
EPI CELLS #/AREA URNS HPF: ABNORMAL /HPF
ERYTHROCYTE [DISTWIDTH] IN BLOOD BY AUTOMATED COUNT: 12.6 % (ref 12.3–15.4)
GLOBULIN SER CALC-MCNC: 2.1 GM/DL
GLUCOSE SERPL-MCNC: 84 MG/DL (ref 65–99)
GLUCOSE UR QL: NEGATIVE
HCT VFR BLD AUTO: 35.3 % (ref 34–46.6)
HDLC SERPL-MCNC: 73 MG/DL (ref 40–60)
HEMOCCULT STL QL IA: NEGATIVE
HGB BLD-MCNC: 11.6 G/DL (ref 12–15.9)
HGB UR QL STRIP: ABNORMAL
IMM GRANULOCYTES # BLD AUTO: 0.01 10*3/MM3 (ref 0–0.05)
IMM GRANULOCYTES NFR BLD AUTO: 0.2 % (ref 0–0.5)
KETONES UR QL STRIP: NEGATIVE
LDLC SERPL CALC-MCNC: 84 MG/DL (ref 0–100)
LEUKOCYTE ESTERASE UR QL STRIP: ABNORMAL
LYMPHOCYTES # BLD AUTO: 1.37 10*3/MM3 (ref 0.7–3.1)
LYMPHOCYTES NFR BLD AUTO: 33.7 % (ref 19.6–45.3)
MCH RBC QN AUTO: 31.6 PG (ref 26.6–33)
MCHC RBC AUTO-ENTMCNC: 32.9 G/DL (ref 31.5–35.7)
MCV RBC AUTO: 96.2 FL (ref 79–97)
MONOCYTES # BLD AUTO: 0.37 10*3/MM3 (ref 0.1–0.9)
MONOCYTES NFR BLD AUTO: 9.1 % (ref 5–12)
NEUTROPHILS # BLD AUTO: 2.23 10*3/MM3 (ref 1.7–7)
NEUTROPHILS NFR BLD AUTO: 55.1 % (ref 42.7–76)
NITRITE UR QL STRIP: NEGATIVE
NRBC BLD AUTO-RTO: 0 /100 WBC (ref 0–0.2)
PH UR STRIP: 6 [PH] (ref 5–8)
PLATELET # BLD AUTO: 161 10*3/MM3 (ref 140–450)
POTASSIUM SERPL-SCNC: 4.4 MMOL/L (ref 3.5–5.2)
PROT SERPL-MCNC: 6.4 G/DL (ref 6–8.5)
PROT UR QL STRIP: NEGATIVE
RBC # BLD AUTO: 3.67 10*6/MM3 (ref 3.77–5.28)
RBC #/AREA URNS HPF: ABNORMAL /HPF
SODIUM SERPL-SCNC: 141 MMOL/L (ref 136–145)
SP GR UR: 1.02 (ref 1–1.03)
TRIGL SERPL-MCNC: 51 MG/DL (ref 0–150)
UROBILINOGEN UR STRIP-MCNC: ABNORMAL MG/DL
VLDLC SERPL CALC-MCNC: 10 MG/DL (ref 5–40)
WBC # BLD AUTO: 4.06 10*3/MM3 (ref 3.4–10.8)
WBC #/AREA URNS HPF: ABNORMAL /HPF

## 2021-02-15 PROCEDURE — 99396 PREV VISIT EST AGE 40-64: CPT | Performed by: INTERNAL MEDICINE

## 2021-02-15 PROCEDURE — 77067 SCR MAMMO BI INCL CAD: CPT | Performed by: INTERNAL MEDICINE

## 2021-02-15 PROCEDURE — 82274 ASSAY TEST FOR BLOOD FECAL: CPT | Performed by: INTERNAL MEDICINE

## 2021-02-15 PROCEDURE — 77067 SCR MAMMO BI INCL CAD: CPT | Performed by: RADIOLOGY

## 2021-02-15 PROCEDURE — 77080 DXA BONE DENSITY AXIAL: CPT | Performed by: INTERNAL MEDICINE

## 2021-02-15 NOTE — PROGRESS NOTES
"Chief Complaint  Annual Exam    Subjective          Franci Mandujano presents to [facility] for   History of Present Illness    Review of Systems   Constitutional: Negative for appetite change, chills, fatigue, fever and unexpected weight change.   HENT: Positive for congestion and sinus pressure. Negative for ear pain, mouth sores, postnasal drip, sneezing, sore throat, tinnitus, trouble swallowing and voice change.    Eyes: Negative for pain and visual disturbance.   Respiratory: Negative for cough, choking, shortness of breath and wheezing.    Cardiovascular: Negative for chest pain, palpitations and leg swelling.   Gastrointestinal: Positive for constipation. Negative for abdominal pain, blood in stool, diarrhea, nausea and vomiting.   Endocrine: Negative for cold intolerance, heat intolerance, polydipsia and polyuria.   Genitourinary: Positive for enuresis (1x). Negative for difficulty urinating, dysuria, flank pain, frequency, hematuria, urgency and vaginal bleeding.   Musculoskeletal: Positive for back pain and neck stiffness. Negative for arthralgias, gait problem, joint swelling, myalgias and neck pain.   Skin: Negative for color change and rash.   Allergic/Immunologic: Positive for environmental allergies. Negative for food allergies and immunocompromised state.   Neurological: Negative for dizziness, tremors, seizures, syncope, speech difficulty, weakness, numbness and headaches.   Hematological: Negative for adenopathy. Does not bruise/bleed easily.   Psychiatric/Behavioral: Negative for agitation, confusion, decreased concentration, dysphoric mood, sleep disturbance and suicidal ideas. The patient is not nervous/anxious.         Objective   Vital Signs:   /71 (BP Location: Left arm, Patient Position: Sitting, Cuff Size: Adult)   Pulse 62   Temp 97.9 °F (36.6 °C)   Resp 15   Ht 163.2 cm (64.25\")   Wt 61.7 kg (136 lb)   SpO2 98%   BMI 23.16 kg/m²   "   Physical Exam  Vitals signs and nursing note reviewed.   Constitutional:       Appearance: She is well-developed.   HENT:      Right Ear: Hearing, tympanic membrane, ear canal and external ear normal.      Left Ear: Hearing, tympanic membrane, ear canal and external ear normal.      Nose:      Right Sinus: No maxillary sinus tenderness or frontal sinus tenderness.      Left Sinus: No maxillary sinus tenderness or frontal sinus tenderness.   Eyes:      General: Lids are normal.      Conjunctiva/sclera: Conjunctivae normal.      Pupils: Pupils are equal, round, and reactive to light.   Neck:      Musculoskeletal: Neck supple.      Thyroid: No thyroid mass or thyromegaly.      Vascular: No carotid bruit or JVD.      Trachea: Trachea normal. No tracheal deviation.   Cardiovascular:      Rate and Rhythm: Normal rate and regular rhythm.      Pulses:           Carotid pulses are 2+ on the right side and 2+ on the left side.       Radial pulses are 2+ on the right side and 2+ on the left side.        Dorsalis pedis pulses are 2+ on the right side and 2+ on the left side.        Posterior tibial pulses are 2+ on the right side and 2+ on the left side.      Heart sounds: S1 normal and S2 normal. No murmur. No friction rub. No gallop.    Pulmonary:      Effort: Pulmonary effort is normal.      Breath sounds: Normal breath sounds.   Chest:      Chest wall: There is no dullness to percussion.      Breasts:         Right: No inverted nipple, mass, nipple discharge, skin change or tenderness.         Left: No inverted nipple, mass, nipple discharge, skin change or tenderness.   Abdominal:      General: Bowel sounds are normal. There is no abdominal bruit.      Palpations: Abdomen is soft.      Tenderness: There is no abdominal tenderness. There is no guarding or rebound.      Hernia: No hernia is present.   Genitourinary:     Rectum: Normal. Guaiac result negative. No mass, tenderness, anal fissure, external hemorrhoid or  internal hemorrhoid. Normal anal tone.   Musculoskeletal: Normal range of motion.   Lymphadenopathy:      Cervical: No cervical adenopathy.      Upper Body:      Right upper body: No supraclavicular adenopathy.      Left upper body: No supraclavicular adenopathy.   Skin:     General: Skin is warm and dry.   Neurological:      Mental Status: She is alert.      Cranial Nerves: No cranial nerve deficit.      Sensory: No sensory deficit.      Deep Tendon Reflexes:      Reflex Scores:       Patellar reflexes are 2+ on the right side and 2+ on the left side.       Result Review :                 Assessment and Plan    Problem List Items Addressed This Visit        Unprioritized    Allergic rhinitis    Current Assessment & Plan     Need daily med prn.         Sciatica    Current Assessment & Plan     Better with home PT.         Slow transit constipation    Current Assessment & Plan     Trial of daily metamucil 6 capsules/d.           Other Visit Diagnoses     Annual physical exam    -  Primary    Relevant Orders    CBC & Differential    Comprehensive Metabolic Panel    Lipid Panel    Urinalysis With Microscopic If Indicated (No Culture) - Urine, Clean Catch    Colon cancer screening        Relevant Orders    POC FECAL OCCULT BLOOD BY IMMUNOASSAY (Completed)          Follow Up   Return in about 1 year (around 2/15/2022) for Annual physical.  Patient was given instructions and counseling regarding her condition or for health maintenance advice. Please see specific information pulled into the AVS if appropriate.      Discussed need to stay up to date on screening exams as indicated on sex, age & risk factors. I encouraged healthy weight maintenance with diet & exercise. Need good sleep habits & continue to avoid tobacco & excessive alcohol.

## 2021-03-24 ENCOUNTER — BULK ORDERING (OUTPATIENT)
Dept: CASE MANAGEMENT | Facility: OTHER | Age: 56
End: 2021-03-24

## 2021-03-24 DIAGNOSIS — Z23 IMMUNIZATION DUE: ICD-10-CM

## 2022-06-08 ENCOUNTER — OFFICE VISIT (OUTPATIENT)
Dept: INTERNAL MEDICINE | Facility: CLINIC | Age: 57
End: 2022-06-08

## 2022-06-08 VITALS
TEMPERATURE: 97.8 F | HEART RATE: 78 BPM | HEIGHT: 64 IN | DIASTOLIC BLOOD PRESSURE: 70 MMHG | OXYGEN SATURATION: 97 % | BODY MASS INDEX: 23.66 KG/M2 | SYSTOLIC BLOOD PRESSURE: 120 MMHG | WEIGHT: 138.6 LBS
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
## 2022-06-08 DIAGNOSIS — S30.860A TICK BITE OF PELVIC REGION, INITIAL ENCOUNTER: Primary | ICD-10-CM

## 2022-06-08 PROCEDURE — 99213 OFFICE O/P EST LOW 20 MIN: CPT | Performed by: NURSE PRACTITIONER

## 2022-06-08 RX ORDER — DOXYCYCLINE HYCLATE 100 MG/1
200 CAPSULE ORAL ONCE
Qty: 2 CAPSULE | Refills: 0 | Status: SHIPPED | OUTPATIENT
Start: 2022-06-08 | End: 2022-06-08

## 2022-06-08 NOTE — PATIENT INSTRUCTIONS
Summer Health Information:     Stay hydrated when outside  If your urine starts to get darker, you are not drinking enough     Protect yourself from ticks and mosquitos  Here are the best ingredients to look for:  DEET (N,N-diethyl-m-toluamide or N,N-diethyl-3-methyl-benzamide)  Picaridin  Oil of lemon eucalyptus (p-menthane-3,8-diol or PMD)  Wear light-colored pants so you can spot ticks easier  If you use a permethrin product, ONLY apply to clothing    Practice Safe Sun!    Use sun screen SPF >50 daily, reapply regularly per directions on package  See dermatologist for skin check regularly  Protect your eyes with sunglasses with UV protection    Poison Ivy  If you are going into areas that may have poison ivy, prepare with a product like IvyX or other ivy blocker.  Wash your clothes and pets after being in an area with ivy when returning home. If you come in contact with poison ivy, try a product like Technu     Other things you should incorporate all year...     Diet:    Eat vegetables, fruits, whole grain, low-fat dairy, poultry, fish, beans, nontropical vegetable oils, and nuts, but avoid red meat (i.e., Mediterranean-style diet, DASH [Dietary Approaches to Stop Hypertension] diet).  Limit sugary drinks and sweets.  Limit saturated and trans fat to 5% to 6% of calories.  Limit sodium intake to 2,400 mg daily (about one teaspoon table salt [kosher/sea salt have less sodium per teaspoon]).    Weight loss / Calorie Counting Apps:    Lose It!   [employer] Pal   Works great when you try it with a partner/ friend. It takes about 15 minutes a day but studies show that this simple method of monitoring your intake can help you achieve goals as it keeps you accountable.  I often will ask patients to try these apps just to get an idea of how much sodium and how many carbohydrates you are taking in.   Exercise:   Engage in moderate-to-vigorous aerobic activity for at least 40 minutes (on average) three to four times each  week.  Wearables:   Activity tracker   Step tracker: getting 7,500 steps daily can cut your cardiac risks by 44%   Bone Health:   [email or web address]  Routine weight bearing exercise
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
## 2022-11-04 ENCOUNTER — OFFICE VISIT (OUTPATIENT)
Dept: INTERNAL MEDICINE | Facility: CLINIC | Age: 57
End: 2022-11-04

## 2022-11-04 VITALS
BODY MASS INDEX: 23.66 KG/M2 | HEART RATE: 62 BPM | SYSTOLIC BLOOD PRESSURE: 130 MMHG | DIASTOLIC BLOOD PRESSURE: 80 MMHG | HEIGHT: 64 IN | OXYGEN SATURATION: 100 % | TEMPERATURE: 96 F | WEIGHT: 138.6 LBS

## 2022-11-04 DIAGNOSIS — R05.1 ACUTE COUGH: Primary | ICD-10-CM

## 2022-11-04 DIAGNOSIS — J40 BRONCHITIS: ICD-10-CM

## 2022-11-04 LAB
EXPIRATION DATE: NORMAL
FLUAV AG UPPER RESP QL IA.RAPID: NOT DETECTED
FLUBV AG UPPER RESP QL IA.RAPID: NOT DETECTED
INTERNAL CONTROL: NORMAL
Lab: NORMAL
SARS-COV-2 AG UPPER RESP QL IA.RAPID: NOT DETECTED

## 2022-11-04 PROCEDURE — 87428 SARSCOV & INF VIR A&B AG IA: CPT | Performed by: NURSE PRACTITIONER

## 2022-11-04 PROCEDURE — 99213 OFFICE O/P EST LOW 20 MIN: CPT | Performed by: NURSE PRACTITIONER

## 2022-11-04 RX ORDER — METHYLPREDNISOLONE 4 MG/1
TABLET ORAL
Qty: 21 EACH | Refills: 0 | Status: SHIPPED | OUTPATIENT
Start: 2022-11-04 | End: 2022-11-16

## 2022-11-04 RX ORDER — AZITHROMYCIN 250 MG/1
TABLET, FILM COATED ORAL
Qty: 6 TABLET | Refills: 0 | Status: SHIPPED | OUTPATIENT
Start: 2022-11-04 | End: 2022-11-16

## 2022-11-04 NOTE — PROGRESS NOTES
Chief Complaint  Nasal Congestion, Cough, Sore Throat, and Earache (Went to  1 week ago. )     Subjective:      History of Present Illness {CC  Problem List  Visit  Diagnosis   Encounters  Notes  Medications  Labs  Result Review Imaging  Media :23}     Franci Mandujano presents to [facility] for:      UC: 10/28/2022: dx allergic rhinitis and given promethazineDM and dymista   Symptoms had been going on for 2 days.   COVID/ Flu neg at that time.   Strep negative     She states she was unable to  dymista.   She has been taking tylenol cold / flu and mucinex.      She feels like it is now settling in her chest.     Covid like symptoms:     Most common symptoms include:  [] Fever  [x] Dry cough  [] Tiredness / fatigue     Less common symptoms:  [] Body aches and pains  [x] Sore throat  [] Diarrhea  [] Conjunctivitis  [] Headache  [] Loss of taste or smell  [] a rash on skin, or discoloration of fingers or toes    Serious symptoms: [] Denies   [] Difficulty breathing or shortness of breath  [x] Chest pain or pressure - mild   [] Loss of speech of movement    Symptom onset:   [x] Gradual   [] Sudden     Symptom progression:   [] Improving  [x] Worsening  (more in chest)   [] Unchanged     Known COVID exposure:  [] Yes  [] No  [x] Unknown     Vaccinated:   [x] Yes  [] No      History of Present Illness       I have reviewed patient's medical history, any new submitted information provided by patient or medical assistant and updated medical record.      Objective:      Physical Exam  Constitutional:       Appearance: Normal appearance. She is not ill-appearing.   HENT:      Right Ear: Tympanic membrane normal.      Left Ear: Tympanic membrane normal.      Nose: Congestion and rhinorrhea present.      Right Turbinates: Swollen.      Left Turbinates: Swollen.      Comments: ? bl nasal polyps       Mouth/Throat:      Mouth: Mucous membranes are moist.      Pharynx:  "Posterior oropharyngeal erythema present. No oropharyngeal exudate.   Eyes:      Conjunctiva/sclera: Conjunctivae normal.   Cardiovascular:      Rate and Rhythm: Normal rate.      Pulses: Normal pulses.      Heart sounds: Normal heart sounds.   Pulmonary:      Comments: Tussive fremitus   - crackle, wheeze        Result Review  Data Reviewed:{ Labs  Result Review  Imaging  Med Tab  Media :23}                  POC   Result Notes  Component Ref Range & Units 11:10 AM    SARS Antigen Not Detected, Presumptive Negative Not Detected    Influenza A Antigen DOE Not Detected Not Detected    Influenza B Antigen DOE Not Detected Not Detected    Internal Control Passed Passed    Lot Number  [id number]    Expiration Date  3/10/2023    Resulting Agency  [facility]            Vital Signs:   /80 (BP Location: Left arm, Patient Position: Sitting, Cuff Size: Adult)   Pulse 62   Temp 96 °F (35.6 °C) (Temporal)   Ht 162.6 cm (64\")   Wt 62.9 kg (138 lb 9.6 oz)   SpO2 100%   BMI 23.79 kg/m²         Requested Prescriptions     Signed Prescriptions Disp Refills   • methylPREDNISolone (MEDROL) 4 MG dose pack 21 each 0     Sig: Take as directed on package instructions.   • azithromycin (Zithromax Z-Magdi) 250 MG tablet 6 tablet 0     Sig: Take 2 tablets by mouth on day 1, then 1 tablet daily on days 2-5       Routine medications provided by this office will also be refilled via pharmacy request.       Current Outpatient Medications:   •  cyclobenzaprine (FLEXERIL) 10 MG tablet, Take 1 tablet by mouth 3 (Three) Times a Day As Needed for Muscle Spasms., Disp: 30 tablet, Rfl: 1  •  guaiFENesin (MUCINEX) 600 MG 12 hr tablet, Take 1,200 mg by mouth As Needed for cough or congestion., Disp: , Rfl:   •  Pseudoephedrine-Ibuprofen  MG tablet, Take 2 capsules by mouth As Needed (cold/sinus)., Disp: , Rfl:   •  vitamin B-12 (CYANOCOBALAMIN) 1000 MCG tablet, Take 1,000 mcg by mouth Continuous As Needed., Disp: , Rfl:   •  vitamin C " (ASCORBIC ACID) 500 MG tablet, Take 500 mg by mouth As Needed., Disp: , Rfl:   •  azithromycin (Zithromax Z-Magdi) 250 MG tablet, Take 2 tablets by mouth on day 1, then 1 tablet daily on days 2-5, Disp: 6 tablet, Rfl: 0  •  methylPREDNISolone (MEDROL) 4 MG dose pack, Take as directed on package instructions., Disp: 21 each, Rfl: 0     Assessment and Plan:      Assessment and Plan {CC Problem List  Visit Diagnosis  ROS  Review (Popup)  Health Maintenance  Quality  BestPractice  Medications  SmartSets  SnapShot Encounters  Media :23}     Problem List Items Addressed This Visit    None  Visit Diagnoses     Acute cough    -  Primary    Relevant Medications    methylPREDNISolone (MEDROL) 4 MG dose pack    azithromycin (Zithromax Z-Magdi) 250 MG tablet    Other Relevant Orders    POCT SARS-CoV-2 Antigen DOE + Flu (Completed)    Bronchitis        Relevant Medications    methylPREDNISolone (MEDROL) 4 MG dose pack    azithromycin (Zithromax Z-Magdi) 250 MG tablet          Follow Up {Instructions Charge Capture  Follow-up Communications :23}     Return if symptoms worsen or fail to improve.      Patient was given instructions and counseling regarding her condition or for health maintenance advice. Please see specific information pulled into the AVS if appropriate.    Dragon disclaimer:   Much of this encounter note is an electronic transcription/translation of spoken language to printed text. The electronic translation of spoken language may permit erroneous, or at times, nonsensical words or phrases to be inadvertently transcribed; Although I have reviewed the note for such errors, some may still exist.     Additional Patient Counseling:       Patient Instructions   ACUTE BRONCHITIS    Bronchitis is inflammation of the airways that extend from the windpipe into the lungs (bronchi). The inflammation often causes mucus to develop. This leads to a cough, which is the most common symptom of bronchitis.    In acute  bronchitis, the condition usually develops suddenly and goes away over time, usually in a couple weeks. Smoking, allergies, and asthma can make bronchitis worse.     CAUSES  Acute bronchitis is most often caused by the same virus that causes a cold. The virus can spread from person to person (contagious) through coughing, sneezing, and touching contaminated objects.Some atypical bacteria may also cause bronchitis.  SIGNS AND SYMPTOMS    · Cough.    · Fever.    · Coughing up mucus.    · Body aches.    · Chest congestion.    · Chills.    · Shortness of breath.    · Sore throat.      TREATMENT    Medicines are usually given for relief of fever or cough. Antibiotics may be prescribed in certain situations. In some cases, an inhaler may be recommended to help reduce shortness of breath and control the cough.  HOME CARE INSTRUCTIONS  · Get plenty of rest.    · Drink enough fluids to keep your urine clear or pale yellow. Increasing fluids may help thin your respiratory secretions and reduce chest congestion, and it will prevent dehydration.    · If you were prescribed an antibiotic, finish it all even if you start to feel better.  · Avoid smoking and secondhand smoke..    · Keep all follow-up visits as directed by your doctor.    SEEK MEDICAL CARE IF:  Your symptoms do not improve after 1 week of treatment.    SEEK IMMEDIATE MEDICAL CARE IF:  · You develop an increased fever or chills.    · You have chest pain.    · You have severe shortness of breath.  · You have bloody sputum.    · You develop dehydration.  · You faint or repeatedly feel like you are going to pass out.  · You develop repeated vomiting.  · You develop a severe headache.

## 2022-11-04 NOTE — PATIENT INSTRUCTIONS
ACUTE BRONCHITIS    Bronchitis is inflammation of the airways that extend from the windpipe into the lungs (bronchi). The inflammation often causes mucus to develop. This leads to a cough, which is the most common symptom of bronchitis.    In acute bronchitis, the condition usually develops suddenly and goes away over time, usually in a couple weeks. Smoking, allergies, and asthma can make bronchitis worse.     CAUSES  Acute bronchitis is most often caused by the same virus that causes a cold. The virus can spread from person to person (contagious) through coughing, sneezing, and touching contaminated objects.Some atypical bacteria may also cause bronchitis.  SIGNS AND SYMPTOMS    · Cough.    · Fever.    · Coughing up mucus.    · Body aches.    · Chest congestion.    · Chills.    · Shortness of breath.    · Sore throat.      TREATMENT    Medicines are usually given for relief of fever or cough. Antibiotics may be prescribed in certain situations. In some cases, an inhaler may be recommended to help reduce shortness of breath and control the cough.  HOME CARE INSTRUCTIONS  · Get plenty of rest.    · Drink enough fluids to keep your urine clear or pale yellow. Increasing fluids may help thin your respiratory secretions and reduce chest congestion, and it will prevent dehydration.    · If you were prescribed an antibiotic, finish it all even if you start to feel better.  · Avoid smoking and secondhand smoke..    · Keep all follow-up visits as directed by your doctor.    SEEK MEDICAL CARE IF:  Your symptoms do not improve after 1 week of treatment.    SEEK IMMEDIATE MEDICAL CARE IF:  · You develop an increased fever or chills.    · You have chest pain.    · You have severe shortness of breath.  · You have bloody sputum.    · You develop dehydration.  · You faint or repeatedly feel like you are going to pass out.  · You develop repeated vomiting.  · You develop a severe headache.

## 2022-11-16 ENCOUNTER — TELEMEDICINE (OUTPATIENT)
Dept: INTERNAL MEDICINE | Facility: CLINIC | Age: 57
End: 2022-11-16

## 2022-11-16 DIAGNOSIS — Z20.822 EXPOSURE TO CONFIRMED CASE OF COVID-19: ICD-10-CM

## 2022-11-16 DIAGNOSIS — R05.1 ACUTE COUGH: Primary | ICD-10-CM

## 2022-11-16 DIAGNOSIS — J02.9 SORE THROAT: ICD-10-CM

## 2022-11-16 PROCEDURE — 99213 OFFICE O/P EST LOW 20 MIN: CPT | Performed by: NURSE PRACTITIONER

## 2022-11-16 RX ORDER — BENZONATATE 100 MG/1
100 CAPSULE ORAL 3 TIMES DAILY PRN
Qty: 30 CAPSULE | Refills: 0 | Status: SHIPPED | OUTPATIENT
Start: 2022-11-16 | End: 2022-12-15

## 2022-11-16 NOTE — PROGRESS NOTES
Name: Franci Mandujano  :  1965  Provider: Hussain Mccormick III, NP-C     Subjective:      Chief Complaint   Patient presents with   • Cough      positive for COVID        Franci Mandujano is a 57 y.o. female and has scheduled an Video Visit.     Patient presents during the COVID-19 pandemic/federally declared [facility] of public health emergency.   This service was conducted via TeleType: Epic Video.     Patient is having:     Covid like symptoms:     :  positive    Onset: Tuesday evening     Most common symptoms include:  [] Fever - highest 98.9C   [x] Dry cough   [x] Tiredness / fatigue     Less common symptoms:  [] Body aches and pains  [x] Sore throat  [] Diarrhea  [] Conjunctivitis  [] Headache  [] Loss of taste or smell  [] a rash on skin, or discoloration of fingers or toes    Serious symptoms: [x] Denies   [] Difficulty breathing or shortness of breath  [] Chest pain or pressure  [] Loss of speech of movement    Symptom onset:   [x] Gradual   [] Sudden     Symptom progression:   [] Improving  [] Worsening   [x] Unchanged     Known COVID exposure:  [x] Yes:    [] No  [] Unknown     Vaccinated:   [x] Yes: 2022  [] No         Location:   Provider: in office  Patient: TeleLocation: home    I have reviewed the patient's medical history in detail and updated the computerized patient record.    Past Medical History:   Diagnosis Date   • Allergic 1985    Seasonal   • Allergic rhinitis    • Arthritis    • Cellulitis of third finger, right    • History of medical problems 2018    Vaginal prolapse, surgery for mortin's neuroma right foot   • Numbness    • Sciatica    • Vaginal prolapse        Past Surgical History:   Procedure Laterality Date   • COLONOSCOPY N/A 2017    Procedure: COLONOSCOPY TO CECUM;  Surgeon: Guzman Lance Jr., MD;  Location: [facility] ENDOSCOPY;  Service:    • DILATATION AND CURETTAGE     • ENDOMETRIAL ABLATION     • FOOT  SURGERY     • TOE SURGERY Bilateral    • VAGINAL MESH REVISION         Family History   Problem Relation Age of Onset   • Lung cancer Mother          age 63   • Heart disease Father    • Supraventricular tachycardia Sister        Social History     Socioeconomic History   • Marital status: Unknown   Tobacco Use   • Smoking status: Never   • Smokeless tobacco: Never   Substance and Sexual Activity   • Alcohol use: Yes     Alcohol/week: 1.0 standard drink     Types: 1 Cans of beer per week     Comment: Occasional   • Drug use: No   • Sexual activity: Yes     Partners: Male     Birth control/protection: Post-menopausal       Most Recent Immunizations   Administered Date(s) Administered   • COVID-19 (MODERNA) 1st, 2nd, 3rd Dose Only 2022   • COVID-19 (MODERNA) BIVALENT BOOSTER 18+YRS 2022   • Flu Vaccine Quad PF >36MO 10/28/2019   • FluLaval/Fluzone >6mos 2022   • Fluzone Quad >6mos (Multi-dose) 10/28/2019   • Td 2014   • Tdap 2020   • flucelvax quad pfs =>4 YRS 10/24/2018         Review of Systems:   Review of Systems   Constitutional: Positive for chills and fatigue.   HENT: Positive for congestion and sore throat.    Respiratory: Positive for cough. Negative for shortness of breath.    Cardiovascular: Negative for chest pain.         Objective:      Physical Exam:   NO Physical exam due to video visit.  On Visual and Verbal exam:    Constitution: NAD  Pulmonary: unlabored   Psych: A&O, Calm       Vital Signs:  NO Office Vitals due to video visit.    Patient provided with home vitals which include:        Requested Prescriptions     Signed Prescriptions Disp Refills   • benzonatate (Tessalon Perles) 100 MG capsule 30 capsule 0     Sig: Take 1 capsule by mouth 3 (Three) Times a Day As Needed for Cough.     Routine medications provided by this office will also be refilled via pharmacy request.       Current Outpatient Medications:   •  benzonatate (Tessalon Perles) 100 MG capsule, Take  "1 capsule by mouth 3 (Three) Times a Day As Needed for Cough., Disp: 30 capsule, Rfl: 0  •  cyclobenzaprine (FLEXERIL) 10 MG tablet, Take 1 tablet by mouth 3 (Three) Times a Day As Needed for Muscle Spasms., Disp: 30 tablet, Rfl: 1  •  guaiFENesin (MUCINEX) 600 MG 12 hr tablet, Take 1,200 mg by mouth As Needed for cough or congestion., Disp: , Rfl:   •  Pseudoephedrine-Ibuprofen  MG tablet, Take 2 capsules by mouth As Needed (cold/sinus)., Disp: , Rfl:   •  vitamin B-12 (CYANOCOBALAMIN) 1000 MCG tablet, Take 1,000 mcg by mouth Continuous As Needed., Disp: , Rfl:   •  vitamin C (ASCORBIC ACID) 500 MG tablet, Take 500 mg by mouth As Needed., Disp: , Rfl:        Assessment and Plan:      Based upon patient provided history and account of medical problem, I suspect the patient has:     Problems Addressed this Visit    None  Visit Diagnoses     Acute cough    -  Primary    Relevant Medications    benzonatate (Tessalon Perles) 100 MG capsule    Other Relevant Orders    COVID-19,[facility] ROUTINE, NP/OP SWAB IN TRANSPORT MEDIA OR ESWAB 72 HR TAT - Swab, Anterior nasal    Sore throat        Exposure to confirmed case of COVID-19          Diagnoses       Codes Comments    Acute cough    -  Primary ICD-10-CM: R05.1  ICD-9-CM: 786.2     Sore throat     ICD-10-CM: J02.9  ICD-9-CM: 462     Exposure to confirmed case of COVID-19     ICD-10-CM: Z20.822  ICD-9-CM: V01.79            See \"patient instructions\" for portions of the plan for treatment.     Discussed any change in Rx and discussed visit with patient.  All questions answered.      Her work prefers PCR test - will have done today outside of office and notify her when results are back which can take about 24 hours.     Return if symptoms worsen or fail to improve.    Jonathan \"Yusuf\" Jace, APRN   11/16/22    Dragon disclaimer:   Much of this encounter note is an electronic transcription/translation of spoken language to printed text. The electronic translation of " spoken language may permit erroneous, or at times, nonsensical words or phrases to be inadvertently transcribed; Although I have reviewed the note for such errors, some may still exist.     Additional Patient Counseling:       Patient Instructions       Symptom Treatment:     Fever/ Chills / Headache / Body Aches:    Tylenol is recommended: if you are not getting any improvement you can alternate Tylenol and Ibuprofen (Ibuprofen should always be taken with food)      You may take over-the-counter Tylenol Cold/Flu Remedy (if you do - do not take additional tylenol as this will have tylenol included)     We advise that patients stay well hydrated, particularly those patients with sustained or higher fevers, in whom insensible fluid losses may be significant.    ?Cough that is persistent, interferes with sleep, or causes discomfort can be managed with an over-the-counter cough medication.     Additionally take if checked and prescribed:   [x] Benzonatate 100 mg can be taken orally three times a day as needed.  Do not take with a hot beverage.     Honey and Lemon Tea for cough  • 1 Tbsp lemon juice   • 2 Tbsp honey   • 1/2 cup or more of hot water  Directions:   Put honey and lemon juice into a tea cup or mug. Add hot water and stir. Add more lemon juice, honey, or hot water to taste.  Sip on this and have two or three per day while cough is present.     Not for children less than 2 years of age.   Caution if diabetic.        Warm salt water gargles for sore throat every 2-3 hours.     Breathing exercises  Pursed lip breathing exercises:   0 Sitting upright or slightly reclining, relax your neck and shoulder muscles.   0 With your mouth closed, inhale through the nose for 2 seconds, as if smelling a flower.   0 Exhale slowly (for 4 seconds if possible) through pursed lips, as if blowing out birthday candles.               Repeat inhalation and exhalation cycles for 2 minutes, several times a day and              when  needed.   Deep breathing exercises:   0 Recline in bed or on a sofa with a pillow under your head and knees. If reclining is not possible, this may be done while sitting upright.   0 Place one hand on your belly, the other hand on your chest.   0 Slowly inhale through your nose; let your lungs fill with air, allowing your belly to rise. (The hand on the belly should move more than the hand on the chest.)   0 Breathe out through your nose, and as you exhale, feel your belly lower.   0 Repeat the inhalation and exhalation cycles for 2 to 5 minutes several times a day and when needed.       Worsening:      If you have new onset of shortness of breath, worsening shortness of breath, dizziness, and mental status changes such as confusion.   GO TO THE EMERGENCY ROOM OR CALL 911.       Isolation:     Current CDC guideline recommends isolation for 5 days from symptom onset and at least 24 hours being fever-free without taking fever reducer like Tylenol or Ibuprofen.       [place] Coronavirus Hotline: 1.375.797.9825

## 2022-11-16 NOTE — PATIENT INSTRUCTIONS
Symptom Treatment:     Fever/ Chills / Headache / Body Aches:    Tylenol is recommended: if you are not getting any improvement you can alternate Tylenol and Ibuprofen (Ibuprofen should always be taken with food)      You may take over-the-counter Tylenol Cold/Flu Remedy (if you do - do not take additional tylenol as this will have tylenol included)     We advise that patients stay well hydrated, particularly those patients with sustained or higher fevers, in whom insensible fluid losses may be significant.    ?Cough that is persistent, interferes with sleep, or causes discomfort can be managed with an over-the-counter cough medication.     Additionally take if checked and prescribed:   [x] Benzonatate 100 mg can be taken orally three times a day as needed.  Do not take with a hot beverage.     Honey and Lemon Tea for cough  1 Tbsp lemon juice   2 Tbsp honey   1/2 cup or more of hot water  Directions:   Put honey and lemon juice into a tea cup or mug. Add hot water and stir. Add more lemon juice, honey, or hot water to taste.  Sip on this and have two or three per day while cough is present.     Not for children less than 2 years of age.   Caution if diabetic.        Warm salt water gargles for sore throat every 2-3 hours.     Breathing exercises  Pursed lip breathing exercises:   Sitting upright or slightly reclining, relax your neck and shoulder muscles.   With your mouth closed, inhale through the nose for 2 seconds, as if smelling a flower.   Exhale slowly (for 4 seconds if possible) through pursed lips, as if blowing out birthday candles.               Repeat inhalation and exhalation cycles for 2 minutes, several times a day and              when needed.   Deep breathing exercises:   Recline in bed or on a sofa with a pillow under your head and knees. If reclining is not possible, this may be done while sitting upright.   Place one hand on your belly, the other hand on your chest.   Slowly inhale through  your nose; let your lungs fill with air, allowing your belly to rise. (The hand on the belly should move more than the hand on the chest.)   Breathe out through your nose, and as you exhale, feel your belly lower.   Repeat the inhalation and exhalation cycles for 2 to 5 minutes several times a day and when needed.       Worsening:      If you have new onset of shortness of breath, worsening shortness of breath, dizziness, and mental status changes such as confusion.   GO TO THE EMERGENCY ROOM OR CALL 911.       Isolation:     Current CDC guideline recommends isolation for 5 days from symptom onset and at least 24 hours being fever-free without taking fever reducer like Tylenol or Ibuprofen.       [place] Coronavirus Hotline: 1.504.838.1628

## 2022-11-17 LAB
LABCORP SARS-COV-2, NAA 2 DAY TAT: NORMAL
SARS-COV-2 RNA RESP QL NAA+PROBE: DETECTED

## 2022-12-15 ENCOUNTER — OFFICE VISIT (OUTPATIENT)
Dept: INTERNAL MEDICINE | Facility: CLINIC | Age: 57
End: 2022-12-15

## 2022-12-15 VITALS
WEIGHT: 139.6 LBS | SYSTOLIC BLOOD PRESSURE: 120 MMHG | OXYGEN SATURATION: 98 % | BODY MASS INDEX: 23.83 KG/M2 | DIASTOLIC BLOOD PRESSURE: 74 MMHG | TEMPERATURE: 98 F | HEART RATE: 67 BPM | HEIGHT: 64 IN

## 2022-12-15 DIAGNOSIS — Z00.00 ANNUAL PHYSICAL EXAM: Primary | ICD-10-CM

## 2022-12-15 DIAGNOSIS — Z13.6 ENCOUNTER FOR LIPID SCREENING FOR CARDIOVASCULAR DISEASE: ICD-10-CM

## 2022-12-15 DIAGNOSIS — J30.2 SEASONAL ALLERGIC RHINITIS, UNSPECIFIED TRIGGER: Chronic | ICD-10-CM

## 2022-12-15 DIAGNOSIS — M62.838 MUSCLE SPASMS OF NECK: ICD-10-CM

## 2022-12-15 DIAGNOSIS — J33.9 NASAL POLYP: ICD-10-CM

## 2022-12-15 DIAGNOSIS — Z23 NEED FOR SHINGLES VACCINE: ICD-10-CM

## 2022-12-15 DIAGNOSIS — Z13.6 SCREENING FOR HYPERTENSION: ICD-10-CM

## 2022-12-15 DIAGNOSIS — Z13.220 ENCOUNTER FOR LIPID SCREENING FOR CARDIOVASCULAR DISEASE: ICD-10-CM

## 2022-12-15 LAB
ALBUMIN SERPL-MCNC: 4.8 G/DL (ref 3.5–5.2)
ALBUMIN/GLOB SERPL: 2.7 G/DL
ALP SERPL-CCNC: 64 U/L (ref 39–117)
ALT SERPL-CCNC: 15 U/L (ref 1–33)
AST SERPL-CCNC: 19 U/L (ref 1–32)
BILIRUB SERPL-MCNC: 0.3 MG/DL (ref 0–1.2)
BUN SERPL-MCNC: 22 MG/DL (ref 6–20)
BUN/CREAT SERPL: 23.4 (ref 7–25)
CALCIUM SERPL-MCNC: 10.2 MG/DL (ref 8.6–10.5)
CHLORIDE SERPL-SCNC: 106 MMOL/L (ref 98–107)
CHOLEST SERPL-MCNC: 208 MG/DL (ref 0–200)
CO2 SERPL-SCNC: 29.1 MMOL/L (ref 22–29)
CREAT SERPL-MCNC: 0.94 MG/DL (ref 0.57–1)
EGFRCR SERPLBLD CKD-EPI 2021: 70.5 ML/MIN/1.73
ERYTHROCYTE [DISTWIDTH] IN BLOOD BY AUTOMATED COUNT: 12.7 % (ref 12.3–15.4)
GLOBULIN SER CALC-MCNC: 1.8 GM/DL
GLUCOSE SERPL-MCNC: 93 MG/DL (ref 65–99)
HCT VFR BLD AUTO: 37.9 % (ref 34–46.6)
HDLC SERPL-MCNC: 79 MG/DL (ref 40–60)
HGB BLD-MCNC: 12.5 G/DL (ref 12–15.9)
LDLC SERPL CALC-MCNC: 118 MG/DL (ref 0–100)
LDLC/HDLC SERPL: 1.47 {RATIO}
MCH RBC QN AUTO: 30.7 PG (ref 26.6–33)
MCHC RBC AUTO-ENTMCNC: 33 G/DL (ref 31.5–35.7)
MCV RBC AUTO: 93.1 FL (ref 79–97)
PLATELET # BLD AUTO: 177 10*3/MM3 (ref 140–450)
POTASSIUM SERPL-SCNC: 4.8 MMOL/L (ref 3.5–5.2)
PROT SERPL-MCNC: 6.6 G/DL (ref 6–8.5)
RBC # BLD AUTO: 4.07 10*6/MM3 (ref 3.77–5.28)
SODIUM SERPL-SCNC: 142 MMOL/L (ref 136–145)
TRIGL SERPL-MCNC: 64 MG/DL (ref 0–150)
VLDLC SERPL CALC-MCNC: 11 MG/DL (ref 5–40)
WBC # BLD AUTO: 3.93 10*3/MM3 (ref 3.4–10.8)

## 2022-12-15 PROCEDURE — 90750 HZV VACC RECOMBINANT IM: CPT | Performed by: NURSE PRACTITIONER

## 2022-12-15 PROCEDURE — 99396 PREV VISIT EST AGE 40-64: CPT | Performed by: NURSE PRACTITIONER

## 2022-12-15 NOTE — PATIENT INSTRUCTIONS
Diet:    Eat vegetables, fruits, whole grain, low-fat dairy, poultry, fish, beans, nontropical vegetable oils, and nuts, but avoid red meat (i.e., Mediterranean-style diet, DASH [Dietary Approaches to Stop Hypertension] diet).  Limit sugary drinks and sweets.  Limit saturated and trans fat to 5% to 6% of calories.  Limit sodium intake to 2,400 mg daily (about one teaspoon table salt [kosher/sea salt have less sodium per teaspoon]).    Weight loss / Calorie Counting Apps:    Lose It!   [employer] Pal     Exercise:   Engage in moderate-to-vigorous aerobic activity for at least 40 minutes (on average) three to four times each week.    Wearables:   Activity tracker   Step tracker     Skin Care:   Use sun screen SPF >30 daily  Dermatologist for skin check regularly    Bone Health:   [email or web address]    Mental Health:       CDC recommends Flu vaccines for everyone 6 months and older EVERY season with rare exceptions.

## 2022-12-15 NOTE — PROGRESS NOTES
Chief Complaint  Annual Exam     Subjective:      History of Present Illness {CC  Problem List  Visit  Diagnosis   Encounters  Notes  Medications  Labs  Result Review Imaging  Media :23}     Franci Mandujano presents to [facility] for:  Annual exam excluding GYN exam.     She states she is doing well.       Franci is here for coordination of medical care, to discuss health maintenance, disease prevention as well as to followup on medical problems.     Patient Care Team:  Hussain Mccormick III, NP-C as PCP - General (Internal Medicine)  Pam Hoskins MD (Dermatology)  Sherley Sanchez MD as Consulting Physician (Obstetrics and Gynecology)  Jonathan Daniel DPM (Inactive) as Consulting Physician (Podiatry)  Dori Alvarez MD as Consulting Physician (Obstetrics and Gynecology)  Gustavo Munguia OD (Optometry)     Activity level is moderate.   Walks dog.    No SOA or CP with activity.     Weight trend is     Wt Readings from Last 4 Encounters:   12/15/22 63.3 kg (139 lb 9.6 oz)   11/04/22 62.9 kg (138 lb 9.6 oz)   06/08/22 62.9 kg (138 lb 9.6 oz)   02/15/21 61.7 kg (136 lb)         Health Maintenance Female:    · GYN: Antonio: lost to follow up and will re-establish and get mammogram.   · Advised routine self-breast exams monthly.  · Fm Hx: Father's side and sister had breast CA.     Colon cancer screen:   She has no change in bowel habits.   Patient's last colonoscopy was 2/3/2017.   She was advised to repeat in 10 years.    Vaccines: due for shingles      Advised regular sunscreen.    Will refer to derm for skin check.       I have reviewed patient's medical history, any new submitted information provided by patient or medical assistant and updated medical record.      Objective:      Physical Exam  Vitals reviewed.   Constitutional:       Appearance: Normal appearance. She is well-developed.   HENT:      Head: Normocephalic.      Nose:  Mucosal edema present.      Left Turbinates: Swollen (? polyp ).      Mouth/Throat:      Pharynx: Uvula midline.   Eyes:      Conjunctiva/sclera: Conjunctivae normal.      Pupils: Pupils are equal, round, and reactive to light.   Neck:      Thyroid: No thyromegaly.   Cardiovascular:      Rate and Rhythm: Normal rate and regular rhythm.      Pulses: Normal pulses.      Heart sounds: Normal heart sounds, S1 normal and S2 normal. No murmur heard.  Pulmonary:      Effort: Pulmonary effort is normal.      Breath sounds: Normal breath sounds.   Chest:      Chest wall: No deformity.   Abdominal:      General: Bowel sounds are normal.      Palpations: Abdomen is soft.      Tenderness: There is no abdominal tenderness. Negative signs include Lackey's sign.   Musculoskeletal:         General: Normal range of motion.      Cervical back: Normal range of motion and neck supple.      Right lower leg: No edema.      Left lower leg: No edema.   Lymphadenopathy:      Cervical: No cervical adenopathy.   Skin:     General: Skin is warm and dry.      Capillary Refill: Capillary refill takes 2 to 3 seconds.   Neurological:      Mental Status: She is alert and oriented to person, place, and time.      Cranial Nerves: No cranial nerve deficit.      Sensory: No sensory deficit.      Coordination: Coordination normal.   Psychiatric:         Speech: Speech normal.         Behavior: Behavior normal. Behavior is cooperative.         Thought Content: Thought content normal.         Judgment: Judgment normal.        Result Review  Data Reviewed:{ Labs  Result Review  Imaging  Med Tab  Media :23}     The following data was reviewed by: Hussain Mccormick III, NP-C on 12/15/2022      Lab Results   Component Value Date    GLUCOSE 84 02/15/2021    BUN 21 (H) 02/15/2021    CREATININE 0.79 02/15/2021    EGFRIFNONA 75 02/15/2021    EGFRIFAFRI 91 02/15/2021    BCR 26.6 (H) 02/15/2021    K 4.4 02/15/2021    CO2 30.9 (H) 02/15/2021    CALCIUM  "9.3 02/15/2021    PROTENTOTREF 6.4 02/15/2021    ALBUMIN 4.30 02/15/2021    LABIL2 2.0 02/15/2021    AST 21 02/15/2021    ALT 13 02/15/2021            Vital Signs:   /74 (BP Location: Left arm, Patient Position: Sitting, Cuff Size: Adult)   Pulse 67   Temp 98 °F (36.7 °C) (Temporal)   Ht 162.6 cm (64\")   Wt 63.3 kg (139 lb 9.6 oz)   SpO2 98%   BMI 23.96 kg/m²         Requested Prescriptions      No prescriptions requested or ordered in this encounter       Routine medications provided by this office will also be refilled via pharmacy request.       Current Outpatient Medications:   •  cyclobenzaprine (FLEXERIL) 10 MG tablet, Take 1 tablet by mouth 3 (Three) Times a Day As Needed for Muscle Spasms., Disp: 30 tablet, Rfl: 1  •  guaiFENesin (MUCINEX) 600 MG 12 hr tablet, Take 1,200 mg by mouth As Needed for cough or congestion., Disp: , Rfl:   •  Pseudoephedrine-Ibuprofen  MG tablet, Take 2 capsules by mouth As Needed (cold/sinus)., Disp: , Rfl:   •  vitamin B-12 (CYANOCOBALAMIN) 1000 MCG tablet, Take 1,000 mcg by mouth Continuous As Needed., Disp: , Rfl:   •  vitamin C (ASCORBIC ACID) 500 MG tablet, Take 500 mg by mouth As Needed., Disp: , Rfl:      Assessment and Plan:      Assessment and Plan {CC Problem List  Visit Diagnosis  ROS  Review (Popup)  Health Maintenance  Quality  BestPractice  Medications  SmartSets  SnapShot Encounters  Media :23}     Problem List Items Addressed This Visit        Allergies and Adverse Reactions    Allergic rhinitis (Chronic)       Musculoskeletal and Injuries    Muscle spasms of neck (Chronic)    Overview     Flexeril as needed         Other Visit Diagnoses     Annual physical exam    -  Primary    Relevant Orders    Ambulatory Referral to Dermatology    Ambulatory Referral to Gynecology    Comprehensive Metabolic Panel    CBC (No Diff)    Need for shingles vaccine        Relevant Orders    Shingrix Vaccine    Encounter for lipid screening for " cardiovascular disease        Relevant Orders    Lipid Panel With LDL / HDL Ratio    Nasal polyp        Relevant Orders    Ambulatory Referral to ENT (Otolaryngology)    Screening for hypertension        BP controlled - continue low sodium diet, routine exercise.         She will come back in 6 months for 2nd shingles vaccine.     Follow Up {Instructions Charge Capture  Follow-up Communications :23}     Return in about 1 year (around 12/15/2023) for Annual physical.      Patient was given instructions and counseling regarding her condition or for health maintenance advice. Please see specific information pulled into the AVS if appropriate.    Dragon disclaimer:   Much of this encounter note is an electronic transcription/translation of spoken language to printed text. The electronic translation of spoken language may permit erroneous, or at times, nonsensical words or phrases to be inadvertently transcribed; Although I have reviewed the note for such errors, some may still exist.     Additional Patient Counseling:       Patient Instructions   Diet:    • Eat vegetables, fruits, whole grain, low-fat dairy, poultry, fish, beans, nontropical vegetable oils, and nuts, but avoid red meat (i.e., Mediterranean-style diet, DASH [Dietary Approaches to Stop Hypertension] diet).  • Limit sugary drinks and sweets.  • Limit saturated and trans fat to 5% to 6% of calories.  • Limit sodium intake to 2,400 mg daily (about one teaspoon table salt [kosher/sea salt have less sodium per teaspoon]).    Weight loss / Calorie Counting Apps:    • Lose It!   • MyFitness Pal     Exercise:   • Engage in moderate-to-vigorous aerobic activity for at least 40 minutes (on average) three to four times each week.    Wearables:   • Activity tracker   • Step tracker     Skin Care:   • Use sun screen SPF >30 daily  • Dermatologist for skin check regularly    Bone Health:   • [email or web address]    Mental Health:        CDC recommends Flu vaccines for everyone 6 months and older EVERY season with rare exceptions.

## 2023-05-15 ENCOUNTER — CLINICAL SUPPORT (OUTPATIENT)
Dept: INTERNAL MEDICINE | Facility: CLINIC | Age: 58
End: 2023-05-15
Payer: COMMERCIAL

## 2023-05-15 DIAGNOSIS — Z23 NEED FOR SHINGLES VACCINE: Primary | ICD-10-CM

## 2023-05-15 NOTE — PROGRESS NOTES
Answers for HPI/ROS submitted by the patient on 5/15/2023  Please describe your symptoms.: Second Shingles shot  Have you had these symptoms before?: No  How long have you been having these symptoms?: 1-4 days  What is the primary reason for your visit?: Other

## 2023-12-18 ENCOUNTER — OFFICE VISIT (OUTPATIENT)
Dept: INTERNAL MEDICINE | Facility: CLINIC | Age: 58
End: 2023-12-18
Payer: COMMERCIAL

## 2023-12-18 VITALS
BODY MASS INDEX: 24.24 KG/M2 | DIASTOLIC BLOOD PRESSURE: 80 MMHG | OXYGEN SATURATION: 98 % | SYSTOLIC BLOOD PRESSURE: 112 MMHG | HEIGHT: 64 IN | HEART RATE: 62 BPM | WEIGHT: 142 LBS

## 2023-12-18 DIAGNOSIS — Z12.31 ENCOUNTER FOR SCREENING MAMMOGRAM FOR MALIGNANT NEOPLASM OF BREAST: ICD-10-CM

## 2023-12-18 DIAGNOSIS — Z00.00 ANNUAL PHYSICAL EXAM: Primary | ICD-10-CM

## 2023-12-18 LAB
ALBUMIN SERPL-MCNC: 4.2 G/DL (ref 3.5–5.2)
ALBUMIN/GLOB SERPL: 1.9 G/DL
ALP SERPL-CCNC: 61 U/L (ref 39–117)
ALT SERPL-CCNC: 17 U/L (ref 1–33)
AST SERPL-CCNC: 17 U/L (ref 1–32)
BILIRUB SERPL-MCNC: 0.3 MG/DL (ref 0–1.2)
BUN SERPL-MCNC: 21 MG/DL (ref 6–20)
BUN/CREAT SERPL: 21.4 (ref 7–25)
CALCIUM SERPL-MCNC: 9.3 MG/DL (ref 8.6–10.5)
CHLORIDE SERPL-SCNC: 104 MMOL/L (ref 98–107)
CHOLEST SERPL-MCNC: 195 MG/DL (ref 0–200)
CO2 SERPL-SCNC: 26.8 MMOL/L (ref 22–29)
CREAT SERPL-MCNC: 0.98 MG/DL (ref 0.57–1)
EGFRCR SERPLBLD CKD-EPI 2021: 66.6 ML/MIN/1.73
ERYTHROCYTE [DISTWIDTH] IN BLOOD BY AUTOMATED COUNT: 13 % (ref 12.3–15.4)
GLOBULIN SER CALC-MCNC: 2.2 GM/DL
GLUCOSE SERPL-MCNC: 86 MG/DL (ref 65–99)
HCT VFR BLD AUTO: 36.4 % (ref 34–46.6)
HDLC SERPL-MCNC: 81 MG/DL (ref 40–60)
HGB BLD-MCNC: 12.2 G/DL (ref 12–15.9)
LDLC SERPL CALC-MCNC: 106 MG/DL (ref 0–100)
LDLC/HDLC SERPL: 1.3 {RATIO}
MCH RBC QN AUTO: 31.2 PG (ref 26.6–33)
MCHC RBC AUTO-ENTMCNC: 33.5 G/DL (ref 31.5–35.7)
MCV RBC AUTO: 93.1 FL (ref 79–97)
PLATELET # BLD AUTO: 199 10*3/MM3 (ref 140–450)
POTASSIUM SERPL-SCNC: 4.4 MMOL/L (ref 3.5–5.2)
PROT SERPL-MCNC: 6.4 G/DL (ref 6–8.5)
RBC # BLD AUTO: 3.91 10*6/MM3 (ref 3.77–5.28)
SODIUM SERPL-SCNC: 139 MMOL/L (ref 136–145)
TRIGL SERPL-MCNC: 42 MG/DL (ref 0–150)
TSH SERPL DL<=0.005 MIU/L-ACNC: 2.19 UIU/ML (ref 0.27–4.2)
VLDLC SERPL CALC-MCNC: 8 MG/DL (ref 5–40)
WBC # BLD AUTO: 4.78 10*3/MM3 (ref 3.4–10.8)

## 2023-12-18 NOTE — PROGRESS NOTES
Chief Complaint  Annual Exam     Subjective:      History of Present Illness {CC  Problem List  Visit  Diagnosis   Encounters  Notes  Medications  Labs  Result Review Imaging  Media :23}     Franci Mandujano presents to [facility] for:  Annual exam excluding GYN exam.      Hand clinic:   Fader: carpometacarpal OA: steroid injection 12/2023, states improved.       Franci is here for coordination of medical care, to discuss health maintenance, disease prevention as well as to followup on medical problems.     Patient Care Team:  Hussain Mccormick III, NP-C as PCP - General (Internal Medicine)  Pam Hoskins MD (Dermatology)  Sherley Sanchez MD as Consulting Physician (Obstetrics and Gynecology)  Jonathan Daniel DPM (Inactive) as Consulting Physician (Podiatry)  Dori Alvarez MD as Consulting Physician (Obstetrics and Gynecology)  Gustavo Munguia OD (Optometry)     Activity level is moderate.   States taking care of her father who has moved in and not getting to exercise as she did before.     Weight trend is     Health and Weight:   Weight trend is   Wt Readings from Last 4 Encounters:   12/18/23 64.4 kg (142 lb)   12/15/22 63.3 kg (139 lb 9.6 oz)   11/04/22 62.9 kg (138 lb 9.6 oz)   06/08/22 62.9 kg (138 lb 9.6 oz)       BMI is within normal parameters. No other follow-up for BMI required.    Health Maintenance Female:    GYN: Antonio   Mammogram due   Advised routine self-breast exams monthly.  Fm Hx: Father's side and sister had breast CA     Colon cancer screen:   She has no change in bowel habits.   Patient's last colonoscopy was 2/3/2017.   She was advised to repeat in 10 years.    Vaccines: UTD     Last eye exam: UTD     Advised regular sunscreen.      I have reviewed patient's medical history, any new submitted information provided by patient or medical assistant and updated medical record.      Objective:      Physical  "Exam  Vitals reviewed.   Constitutional:       Appearance: Normal appearance. She is well-developed.   HENT:      Head: Normocephalic.      Nose: Nose normal.      Mouth/Throat:      Pharynx: Uvula midline.   Eyes:      Conjunctiva/sclera: Conjunctivae normal.      Pupils: Pupils are equal, round, and reactive to light.   Neck:      Thyroid: No thyromegaly.   Cardiovascular:      Rate and Rhythm: Normal rate and regular rhythm.      Pulses: Normal pulses.      Heart sounds: Normal heart sounds, S1 normal and S2 normal. No murmur heard.  Pulmonary:      Effort: Pulmonary effort is normal.      Breath sounds: Normal breath sounds.   Chest:      Chest wall: No deformity.   Abdominal:      General: Bowel sounds are normal.      Palpations: Abdomen is soft.      Tenderness: There is no abdominal tenderness. Negative signs include Lackey's sign.   Musculoskeletal:         General: Normal range of motion.      Cervical back: Normal range of motion and neck supple.      Right lower leg: No edema.      Left lower leg: No edema.   Lymphadenopathy:      Cervical: No cervical adenopathy.   Skin:     General: Skin is warm and dry.      Capillary Refill: Capillary refill takes 2 to 3 seconds.   Neurological:      Mental Status: She is alert and oriented to person, place, and time.      Cranial Nerves: No cranial nerve deficit.      Sensory: No sensory deficit.      Coordination: Coordination normal.   Psychiatric:         Speech: Speech normal.         Behavior: Behavior normal. Behavior is cooperative.         Thought Content: Thought content normal.         Judgment: Judgment normal.        Result Review  Data Reviewed:{ Labs  Result Review  Imaging  Med Tab  Media :23}                Vital Signs:   /80 (BP Location: Left arm, Patient Position: Sitting, Cuff Size: Adult)   Pulse 62   Ht 162.6 cm (64\")   Wt 64.4 kg (142 lb)   SpO2 98%   BMI 24.37 kg/m²         Requested Prescriptions      No prescriptions " requested or ordered in this encounter       Routine medications provided by this office will also be refilled via pharmacy request.       Current Outpatient Medications:     guaiFENesin (MUCINEX) 600 MG 12 hr tablet, Take 2 tablets by mouth As Needed for Cough or Congestion., Disp: , Rfl:     Pseudoephedrine-Ibuprofen  MG tablet, Take 2 capsules by mouth As Needed (cold/sinus)., Disp: , Rfl:     vitamin C (ASCORBIC ACID) 500 MG tablet, Take 1 tablet by mouth As Needed., Disp: , Rfl:      Assessment and Plan:      Assessment and Plan {CC Problem List  Visit Diagnosis  ROS  Review (Popup)  Health Maintenance  Quality  BestPractice  Medications  SmartSets  SnapShot Encounters  Media :23}     Problem List Items Addressed This Visit    None  Visit Diagnoses       Annual physical exam    -  Primary    Relevant Orders    Comprehensive Metabolic Panel    Lipid Panel With LDL / HDL Ratio    CBC (No Diff)    TSH Rfx On Abnormal To Free T4    Encounter for screening mammogram for malignant neoplasm of breast        Relevant Orders    Mammo screening digital tomosynthesis bilateral w CAD            Follow Up {Instructions Charge Capture  Follow-up Communications :23}     Return in about 1 year (around 12/18/2024) for Annual physical.      Patient was given instructions and counseling regarding her condition or for health maintenance advice. Please see specific information pulled into the AVS if appropriate.    Dragon disclaimer:   Much of this encounter note is an electronic transcription/translation of spoken language to printed text. The electronic translation of spoken language may permit erroneous, or at times, nonsensical words or phrases to be inadvertently transcribed; Although I have reviewed the note for such errors, some may still exist.     Additional Patient Counseling:       Patient Instructions   Diet:    Eat vegetables, fruits, whole grain, low-fat dairy, poultry, fish, beans, nontropical  vegetable oils, and nuts, but avoid red meat (i.e., Mediterranean-style diet, DASH [Dietary Approaches to Stop Hypertension] diet).  Limit sugary drinks and sweets.  Limit saturated and trans fat to 5% to 6% of calories.  Limit sodium intake to 2,400 mg daily (about one teaspoon table salt [kosher/sea salt have less sodium per teaspoon]).    Weight loss / Calorie Counting Apps:    Lose It!   MyBundle Pal     Exercise:   Engage in moderate-to-vigorous aerobic activity for at least 40 minutes (on average) three to four times each week.    Wearables:   Activity tracker   Step tracker     Skin Care:   Use sun screen SPF >30 daily  Dermatologist for skin check regularly    Bone Health:   [email or web address]    Mental Health:       CDC recommends Flu vaccines for everyone 6 months and older EVERY season with rare exceptions.

## 2023-12-18 NOTE — PATIENT INSTRUCTIONS
Diet:    Eat vegetables, fruits, whole grain, low-fat dairy, poultry, fish, beans, nontropical vegetable oils, and nuts, but avoid red meat (i.e., Mediterranean-style diet, DASH [Dietary Approaches to Stop Hypertension] diet).  Limit sugary drinks and sweets.  Limit saturated and trans fat to 5% to 6% of calories.  Limit sodium intake to 2,400 mg daily (about one teaspoon table salt [kosher/sea salt have less sodium per teaspoon]).    Weight loss / Calorie Counting Apps:    Lose It!   MyAllFreed Pal     Exercise:   Engage in moderate-to-vigorous aerobic activity for at least 40 minutes (on average) three to four times each week.    Wearables:   Activity tracker   Step tracker     Skin Care:   Use sun screen SPF >30 daily  Dermatologist for skin check regularly    Bone Health:   [email or web address]    Mental Health:       CDC recommends Flu vaccines for everyone 6 months and older EVERY season with rare exceptions.

## 2024-01-10 ENCOUNTER — HOSPITAL ENCOUNTER (OUTPATIENT)
Dept: MAMMOGRAPHY | Facility: HOSPITAL | Age: 59
Discharge: HOME | End: 2024-01-10
Payer: COMMERCIAL

## 2024-01-10 ENCOUNTER — APPOINTMENT (OUTPATIENT)
Dept: OTHER | Facility: HOSPITAL | Age: 59
End: 2024-01-10
Payer: COMMERCIAL

## 2024-01-10 DIAGNOSIS — Z12.31 ENCOUNTER FOR SCREENING MAMMOGRAM FOR MALIGNANT NEOPLASM OF BREAST: ICD-10-CM

## 2024-01-10 PROCEDURE — 77067 SCR MAMMO BI INCL CAD: CPT

## 2024-01-10 PROCEDURE — 77063 BREAST TOMOSYNTHESIS BI: CPT

## 2024-02-19 ENCOUNTER — TELEPHONE (OUTPATIENT)
Dept: INTERNAL MEDICINE | Facility: CLINIC | Age: 59
End: 2024-02-19
Payer: COMMERCIAL

## 2024-02-19 NOTE — TELEPHONE ENCOUNTER
Provider: GIGI WILSON    Caller: TOMMY VILLASEÑOR    Relationship to Patient: PATIENT        Phone Number: [phone]    Reason for Call: PATIENT IS CALLING TO ASK FOR A COPY OF HER IMMUNIZATION RECORD.  SHE STATES SHE WOULD LIKE TO  BY THE END OF THE WEEK.      PLEASE ADVISE.

## 2024-11-11 ENCOUNTER — TELEPHONE (OUTPATIENT)
Dept: INTERNAL MEDICINE | Facility: CLINIC | Age: 59
End: 2024-11-11
Payer: COMMERCIAL

## 2024-11-20 NOTE — TELEPHONE ENCOUNTER
We are unable to change providers in this office. I can give you the  number to [facility] New Provider line at [phone] they can find a new female Md that is  taking new patients but it would be outside our office .

## 2024-12-30 ENCOUNTER — OFFICE VISIT (OUTPATIENT)
Dept: INTERNAL MEDICINE | Facility: CLINIC | Age: 59
End: 2024-12-30
Payer: COMMERCIAL

## 2024-12-30 VITALS
OXYGEN SATURATION: 98 % | DIASTOLIC BLOOD PRESSURE: 72 MMHG | WEIGHT: 158.5 LBS | BODY MASS INDEX: 27.06 KG/M2 | SYSTOLIC BLOOD PRESSURE: 126 MMHG | HEART RATE: 65 BPM | HEIGHT: 64 IN

## 2024-12-30 DIAGNOSIS — E55.9 VITAMIN D DEFICIENCY: ICD-10-CM

## 2024-12-30 DIAGNOSIS — Z00.00 ANNUAL PHYSICAL EXAM: Primary | ICD-10-CM

## 2024-12-30 PROCEDURE — 99396 PREV VISIT EST AGE 40-64: CPT | Performed by: NURSE PRACTITIONER

## 2024-12-30 NOTE — PROGRESS NOTES
Chief Complaint  Annual Exam     Subjective:      History of Present Illness {CC  Problem List  Visit  Diagnosis   Encounters  Notes  Medications  Labs  Result Review Imaging  Media :23}     Franci Mandujano presents to [facility] for:   Annual exam excluding GYN exam.       Sam ortho: 12/19/24  (Tammie)   Left foot: synovitis left 2nd toe   States injection didn't help but spacer helped.     Still getting injections to right hand: Dr Clemente     Now working at [employer] in [facility]: [facility]     Father living with her.             Franci is here for coordination of medical care, to discuss health maintenance, disease prevention as well as to followup on medical problems.     Patient Care Team:  Hussain Mccormick III, NP-C as PCP - General (Internal Medicine)  Pam Hoskins MD (Inactive) (Dermatology)  Sherley Sanchez MD as Consulting Physician (Obstetrics and Gynecology)  Jonathan Daniel DPM (Inactive) as Consulting Physician (Podiatry)  Dori Alvarez MD as Consulting Physician (Obstetrics and Gynecology)  Gustavo Munguia OD (Optometry)     Activity level is minimal.   Not as much as she would like.      Health and Weight:   Weight trend is   Wt Readings from Last 4 Encounters:   12/30/24 71.9 kg (158 lb 8 oz)   12/18/23 64.4 kg (142 lb)   12/15/22 63.3 kg (139 lb 9.6 oz)   11/04/22 62.9 kg (138 lb 9.6 oz)       BMI is >= 25 and <30. (Overweight) The following options were offered after discussion;: exercise counseling/recommendations and nutrition counseling/recommendations      Mental Health:   PHQ-2 Depression Screening  Little interest or pleasure in doing things? Not at all   Feeling down, depressed, or hopeless? Not at all   PHQ-2 Total Score 0     Health Maintenance Female:  GYN:    Patient's last mammogram was  Last Completed Mammogram            MAMMOGRAM (Every 2 Years) Next due on 1/10/2026      01/10/2024  Mammo Screening  Digital Tomosynthesis Bilateral With CAD    01/10/2024  SCANNED - MAMMO    02/15/2021  Mammo Screening Bilateral With CAD    02/01/2016  Done - GYN                   Advised routine self-breast exams monthly.  Fm Hx: Father's side and sister had breast CA   Pap smears have been:   Postmenopausal: [x] Yes       Colon cancer screen:   Patient's last colonoscopy was   Last Completed Colonoscopy            COLORECTAL CANCER SCREENING (COLONOSCOPY - Every 10 Years) Tentatively due on 2/3/2027      02/03/2017  Surgical Procedure: COLONOSCOPY                   She was advised to repeat in 10 years. 2/2027    Vaccines:   [] Flu     [] Tdap   [] Prevnar 20    [] Shingrix (shingles: series of 2)   [] COVID (booster)    []   [x]Declines vaccines      States flu in October and COVID   Has had hepatitis B series.   For work.     Last eye exam:     Advise regular sunscreen.        I have reviewed patient's medical history, any new submitted information provided by patient or medical assistant and updated medical record.      Objective:      Physical Exam  Vitals reviewed.   Constitutional:       Appearance: She is well-developed.   HENT:      Head: Normocephalic.      Nose: Nose normal.      Mouth/Throat:      Pharynx: Uvula midline.   Neck:      Thyroid: No thyromegaly.   Cardiovascular:      Rate and Rhythm: Normal rate and regular rhythm.      Pulses: Normal pulses.      Heart sounds: Normal heart sounds, S1 normal and S2 normal. No murmur heard.  Pulmonary:      Effort: Pulmonary effort is normal.      Breath sounds: Normal breath sounds.   Chest:      Chest wall: No deformity.   Abdominal:      General: Bowel sounds are normal.      Palpations: Abdomen is soft.      Tenderness: There is no abdominal tenderness. Negative signs include Lackey's sign.   Musculoskeletal:         General: Normal range of motion.      Cervical back: Normal range of motion and neck supple.      Right lower leg: No edema.      Left lower leg: No  "edema.   Lymphadenopathy:      Cervical: No cervical adenopathy.   Skin:     General: Skin is warm and dry.      Capillary Refill: Capillary refill takes 2 to 3 seconds.   Neurological:      General: No focal deficit present.      Mental Status: She is alert and oriented to person, place, and time.   Psychiatric:         Mood and Affect: Mood normal.         Speech: Speech normal.         Behavior: Behavior normal. Behavior is cooperative.         Thought Content: Thought content normal.         Judgment: Judgment normal.        Result Review  Data Reviewed:{ Labs  Result Review  Imaging  Med Tab  Media :23}                Vital Signs:   /72 Comment: manual left  Pulse 65   Ht 162.6 cm (64\")   Wt 71.9 kg (158 lb 8 oz)   SpO2 98%   BMI 27.21 kg/m²   Estimated body mass index is 27.21 kg/m² as calculated from the following:    Height as of this encounter: 162.6 cm (64\").    Weight as of this encounter: 71.9 kg (158 lb 8 oz).        Requested Prescriptions      No prescriptions requested or ordered in this encounter       Routine medications provided by this office will also be refilled via pharmacy request.       Current Outpatient Medications:     guaiFENesin (MUCINEX) 600 MG 12 hr tablet, Take 2 tablets by mouth As Needed for Cough or Congestion., Disp: , Rfl:     Pseudoephedrine-Ibuprofen  MG tablet, Take 2 capsules by mouth As Needed (cold/sinus)., Disp: , Rfl:     vitamin C (ASCORBIC ACID) 500 MG tablet, Take 1 tablet by mouth As Needed., Disp: , Rfl:      Assessment and Plan:      Assessment and Plan {CC Problem List  Visit Diagnosis  ROS  Review (Popup)  [facility] Maintenance  Quality  BestPractice  Medications  SmartSets  SnapShot Encounters  Media :23}     Diagnoses and all orders for this visit:    1. Annual physical exam (Primary)  -     Comprehensive Metabolic Panel  -     Lipid Panel With LDL / HDL Ratio  -     CBC (No Diff)    2. Vitamin D deficiency  -     Vitamin D 25 " hydroxy        Work on diet and weight loss, increase exercise.          No orders of the defined types were placed in this encounter.            Follow Up {Instructions Charge Capture  Follow-up Communications :23}     Return in about 1 year (around 12/30/2025) for Annual physical.      Patient was given instructions and counseling regarding her condition or for health maintenance advice. Please see specific information pulled into the AVS if appropriate.    Dragon disclaimer:   Much of this encounter note is an electronic transcription/translation of spoken language to printed text. The electronic translation of spoken language may permit erroneous, or at times, nonsensical words or phrases to be inadvertently transcribed; Although I have reviewed the note for such errors, some may still exist.     Additional Patient Counseling:       Patient Instructions   Diet:    Eat vegetables, fruits, whole grain, low-fat dairy, poultry, fish, beans, nontropical vegetable oils, and nuts, but avoid red meat (i.e., Mediterranean-style diet, DASH [Dietary Approaches to Stop Hypertension] diet).  Limit sugary drinks and sweets.  Limit saturated and trans fat to 5% to 6% of calories.  Limit sodium intake to 2,400 mg daily (about one teaspoon table salt [kosher/sea salt have less sodium per teaspoon]).    Weight loss / Calorie Counting Apps:    Lose It!   MyFitness Pal     Exercise:   Engage in moderate-to-vigorous aerobic activity for at least 40 minutes (on average) three to four times each week.    Wearables:   Activity tracker   Step tracker     Skin Care:   Use sun screen SPF >30 daily  Dermatologist for skin check regularly    Bone Health:   [email or web address]    Mental Health:       Vaccines:   Updated COVID booster: expected to be released around August 25th 2024: please contact local pharmacy if not available in office today.   Flu vaccine every fall  CDC recommends Flu vaccines for  everyone 6 months and older EVERY season with rare exceptions.      COVID tests: https://covidtests.gov/

## 2024-12-30 NOTE — PATIENT INSTRUCTIONS
Diet:    Eat vegetables, fruits, whole grain, low-fat dairy, poultry, fish, beans, nontropical vegetable oils, and nuts, but avoid red meat (i.e., Mediterranean-style diet, DASH [Dietary Approaches to Stop Hypertension] diet).  Limit sugary drinks and sweets.  Limit saturated and trans fat to 5% to 6% of calories.  Limit sodium intake to 2,400 mg daily (about one teaspoon table salt [kosher/sea salt have less sodium per teaspoon]).    Weight loss / Calorie Counting Apps:    Lose It!   MyStripe Pal     Exercise:   Engage in moderate-to-vigorous aerobic activity for at least 40 minutes (on average) three to four times each week.    Wearables:   Activity tracker   Step tracker     Skin Care:   Use sun screen SPF >30 daily  Dermatologist for skin check regularly    Bone Health:   [email or web address]    Mental Health:       Vaccines:   Updated COVID booster: expected to be released around August 25th 2024: please contact local pharmacy if not available in office today.   Flu vaccine every fall  CDC recommends Flu vaccines for everyone 6 months and older EVERY season with rare exceptions.      COVID tests: https://covidtests.gov/

## 2024-12-31 LAB
25(OH)D3+25(OH)D2 SERPL-MCNC: 13.7 NG/ML (ref 30–100)
ALBUMIN SERPL-MCNC: 4.2 G/DL (ref 3.5–5.2)
ALBUMIN/GLOB SERPL: 1.8 G/DL
ALP SERPL-CCNC: 67 U/L (ref 39–117)
ALT SERPL-CCNC: 15 U/L (ref 1–33)
AST SERPL-CCNC: 18 U/L (ref 1–32)
BILIRUB SERPL-MCNC: 0.2 MG/DL (ref 0–1.2)
BUN SERPL-MCNC: 17 MG/DL (ref 8–23)
BUN/CREAT SERPL: 23.3 (ref 7–25)
CALCIUM SERPL-MCNC: 9.3 MG/DL (ref 8.6–10.5)
CHLORIDE SERPL-SCNC: 105 MMOL/L (ref 98–107)
CHOLEST SERPL-MCNC: 215 MG/DL (ref 0–200)
CO2 SERPL-SCNC: 31.7 MMOL/L (ref 22–29)
CREAT SERPL-MCNC: 0.73 MG/DL (ref 0.57–1)
EGFRCR SERPLBLD CKD-EPI 2021: 94.3 ML/MIN/1.73
ERYTHROCYTE [DISTWIDTH] IN BLOOD BY AUTOMATED COUNT: 12.8 % (ref 12.3–15.4)
GLOBULIN SER CALC-MCNC: 2.3 GM/DL
GLUCOSE SERPL-MCNC: 86 MG/DL (ref 65–99)
HCT VFR BLD AUTO: 37.5 % (ref 34–46.6)
HDLC SERPL-MCNC: 72 MG/DL (ref 40–60)
HGB BLD-MCNC: 12.5 G/DL (ref 12–15.9)
LDLC SERPL CALC-MCNC: 131 MG/DL (ref 0–100)
LDLC/HDLC SERPL: 1.8 {RATIO}
MCH RBC QN AUTO: 30.6 PG (ref 26.6–33)
MCHC RBC AUTO-ENTMCNC: 33.3 G/DL (ref 31.5–35.7)
MCV RBC AUTO: 91.9 FL (ref 79–97)
PLATELET # BLD AUTO: 199 10*3/MM3 (ref 140–450)
POTASSIUM SERPL-SCNC: 4.6 MMOL/L (ref 3.5–5.2)
PROT SERPL-MCNC: 6.5 G/DL (ref 6–8.5)
RBC # BLD AUTO: 4.08 10*6/MM3 (ref 3.77–5.28)
SODIUM SERPL-SCNC: 142 MMOL/L (ref 136–145)
TRIGL SERPL-MCNC: 66 MG/DL (ref 0–150)
VLDLC SERPL CALC-MCNC: 12 MG/DL (ref 5–40)
WBC # BLD AUTO: 5.02 10*3/MM3 (ref 3.4–10.8)

## 2025-01-17 ENCOUNTER — LAB REQUISITION (OUTPATIENT)
Dept: LAB | Facility: HOSPITAL | Age: 60
End: 2025-01-17
Payer: COMMERCIAL

## 2025-01-17 DIAGNOSIS — Z00.00 ENCOUNTER FOR GENERAL ADULT MEDICAL EXAMINATION WITHOUT ABNORMAL FINDINGS: ICD-10-CM

## 2025-01-17 PROCEDURE — 86317 IMMUNOASSAY INFECTIOUS AGENT: CPT | Performed by: EMERGENCY MEDICINE

## 2025-01-19 LAB — HBV SURFACE AB SER-ACNC: 450 MIU/ML

## 2025-05-29 NOTE — TELEPHONE ENCOUNTER
Caller: Franci Mandujano    Relationship: Self    Best call back number: .      Who is your current provider: GIGI WILSON     Is your current provider offboarding? NO     Who would you like your new provider to be: ZINA GLORIA.          What are your reasons for transferring care: PATIENT WOULD PREFER TO HAVE A FEMALE PROVIDER.AND A PROVIDER WHO IS AN MD.         Left arm;

## (undated) DEVICE — THE TORRENT IRRIGATION SCOPE CONNECTOR IS USED WITH THE TORRENT IRRIGATION TUBING TO PROVIDE IRRIGATION FLUIDS SUCH AS STERILE WATER DURING GASTROINTESTINAL ENDOSCOPIC PROCEDURES WHEN USED IN CONJUNCTION WITH AN IRRIGATION PUMP (OR ELECTROSURGICAL UNIT).: Brand: TORRENT

## (undated) DEVICE — Device: Brand: DEFENDO AIR/WATER/SUCTION AND BIOPSY VALVE

## (undated) DEVICE — TUBING, SUCTION, 1/4" X 10', STRAIGHT: Brand: MEDLINE

## (undated) DEVICE — CANN NASL CO2 TRULINK W/O2 A/